# Patient Record
Sex: FEMALE | URBAN - METROPOLITAN AREA
[De-identification: names, ages, dates, MRNs, and addresses within clinical notes are randomized per-mention and may not be internally consistent; named-entity substitution may affect disease eponyms.]

---

## 2023-04-13 ENCOUNTER — HOSPITAL ENCOUNTER (OUTPATIENT)
Facility: AMBULATORY SURGERY CENTER | Age: 43
End: 2023-04-13

## 2023-07-18 RX ORDER — VIT C/B6/B5/MAGNESIUM/HERB 173 50-5-6-5MG
500 CAPSULE ORAL DAILY
COMMUNITY

## 2023-07-18 RX ORDER — KRILL/OM-3/DHA/EPA/PHOSPHO/AST 500-110 MG
500 CAPSULE ORAL DAILY
COMMUNITY

## 2023-07-18 RX ORDER — CYCLOBENZAPRINE HCL 5 MG
5 TABLET ORAL DAILY PRN
COMMUNITY
Start: 2022-10-12 | End: 2023-10-17

## 2023-07-18 RX ORDER — PRENATAL VIT/IRON FUM/FOLIC AC 27MG-0.8MG
1 TABLET ORAL DAILY
COMMUNITY

## 2023-07-18 RX ORDER — BROMOCRIPTINE MESYLATE 2.5 MG/1
TABLET ORAL
COMMUNITY
Start: 2023-07-12

## 2023-07-18 RX ORDER — CHORIOGONADOTROPIN ALFA 250 UG/.5ML
250 INJECTION, SOLUTION SUBCUTANEOUS
COMMUNITY
Start: 2022-10-07 | End: 2023-07-21

## 2023-07-18 RX ORDER — ONDANSETRON 4 MG/1
4 TABLET, ORALLY DISINTEGRATING ORAL DAILY PRN
COMMUNITY
Start: 2022-09-22

## 2023-07-18 RX ORDER — LETROZOLE 2.5 MG/1
2.5 TABLET, FILM COATED ORAL
COMMUNITY
End: 2023-07-21

## 2023-07-18 RX ORDER — PROGESTERONE 200 MG/1
200 CAPSULE ORAL DAILY
COMMUNITY
Start: 2022-04-26

## 2023-07-18 RX ORDER — TRIAMCINOLONE ACETONIDE 1 MG/G
1 CREAM TOPICAL
COMMUNITY
Start: 2023-03-31

## 2023-07-18 RX ORDER — PREDNISONE 20 MG/1
20 TABLET ORAL DAILY PRN
COMMUNITY
Start: 2023-03-31

## 2023-07-18 RX ORDER — CLOMIPHENE CITRATE 50 MG/1
100 TABLET ORAL DAILY
COMMUNITY
Start: 2021-11-30 | End: 2023-07-21

## 2023-07-20 ENCOUNTER — ANESTHESIA EVENT (OUTPATIENT)
Dept: SURGERY | Facility: AMBULATORY SURGERY CENTER | Age: 43
End: 2023-07-20
Payer: COMMERCIAL

## 2023-07-21 ENCOUNTER — ANESTHESIA (OUTPATIENT)
Dept: SURGERY | Facility: AMBULATORY SURGERY CENTER | Age: 43
End: 2023-07-21
Payer: COMMERCIAL

## 2023-07-21 ENCOUNTER — HOSPITAL ENCOUNTER (OUTPATIENT)
Facility: AMBULATORY SURGERY CENTER | Age: 43
Discharge: HOME | End: 2023-07-21
Payer: COMMERCIAL

## 2023-07-21 VITALS
TEMPERATURE: 96.6 F | HEART RATE: 76 BPM | WEIGHT: 126 LBS | HEIGHT: 68 IN | OXYGEN SATURATION: 100 % | BODY MASS INDEX: 19.1 KG/M2 | SYSTOLIC BLOOD PRESSURE: 103 MMHG | DIASTOLIC BLOOD PRESSURE: 57 MMHG | RESPIRATION RATE: 16 BRPM

## 2023-07-21 DIAGNOSIS — N80.00 ENDOMETRIOSIS, UTERUS: ICD-10-CM

## 2023-07-21 DIAGNOSIS — N80.519 ENDOMETRIOSIS, RECTUM: ICD-10-CM

## 2023-07-21 DIAGNOSIS — N72 ENDOCERVICITIS: ICD-10-CM

## 2023-07-21 DIAGNOSIS — N94.6 DYSMENORRHEA: ICD-10-CM

## 2023-07-21 DIAGNOSIS — N80.A62: ICD-10-CM

## 2023-07-21 DIAGNOSIS — N83.209 CYST OF OVARY, UNSPECIFIED LATERALITY: ICD-10-CM

## 2023-07-21 DIAGNOSIS — G89.18 POST-OP PAIN: Primary | ICD-10-CM

## 2023-07-21 DIAGNOSIS — K66.0 INTESTINAL ADHESIONS: ICD-10-CM

## 2023-07-21 DIAGNOSIS — N80.A0 ENDOMETRIOSIS, BLADDER: ICD-10-CM

## 2023-07-21 DIAGNOSIS — Z79.2 PROPHYLACTIC ANTIBIOTIC: ICD-10-CM

## 2023-07-21 DIAGNOSIS — N71.1 CHRONIC ENDOMETRITIS: ICD-10-CM

## 2023-07-21 DIAGNOSIS — N93.9 ABNORMAL UTERINE BLEEDING (AUB): ICD-10-CM

## 2023-07-21 DIAGNOSIS — N80.109 ENDOMETRIOSIS, OVARY: ICD-10-CM

## 2023-07-21 LAB
HCG UR QL: NEGATIVE
INTERNAL QC OK POCT: NORMAL
POCT KIT EXPIRATION DATE: NORMAL
POCT KIT LOT NUMBER: NORMAL

## 2023-07-21 PROCEDURE — 87798 DETECT AGENT NOS DNA AMP: CPT | Mod: 90 | Performed by: OBSTETRICS & GYNECOLOGY

## 2023-07-21 PROCEDURE — 87176 TISSUE HOMOGENIZATION CULTR: CPT | Performed by: OBSTETRICS & GYNECOLOGY

## 2023-07-21 PROCEDURE — 87102 FUNGUS ISOLATION CULTURE: CPT | Performed by: OBSTETRICS & GYNECOLOGY

## 2023-07-21 PROCEDURE — 99000 SPECIMEN HANDLING OFFICE-LAB: CPT | Performed by: OBSTETRICS & GYNECOLOGY

## 2023-07-21 PROCEDURE — 87563 M. GENITALIUM AMP PROBE: CPT | Mod: 90 | Performed by: OBSTETRICS & GYNECOLOGY

## 2023-07-21 PROCEDURE — 87591 N.GONORRHOEAE DNA AMP PROB: CPT | Performed by: OBSTETRICS & GYNECOLOGY

## 2023-07-21 PROCEDURE — 87491 CHLMYD TRACH DNA AMP PROBE: CPT | Performed by: OBSTETRICS & GYNECOLOGY

## 2023-07-21 PROCEDURE — 87075 CULTR BACTERIA EXCEPT BLOOD: CPT | Performed by: OBSTETRICS & GYNECOLOGY

## 2023-07-21 PROCEDURE — 87070 CULTURE OTHR SPECIMN AEROBIC: CPT | Performed by: OBSTETRICS & GYNECOLOGY

## 2023-07-21 RX ORDER — OXYCODONE HYDROCHLORIDE 10 MG/1
10 TABLET ORAL
Status: DISCONTINUED | OUTPATIENT
Start: 2023-07-21 | End: 2023-07-22 | Stop reason: HOSPADM

## 2023-07-21 RX ORDER — BUPIVACAINE HYDROCHLORIDE 2.5 MG/ML
INJECTION, SOLUTION INFILTRATION; PERINEURAL PRN
Status: DISCONTINUED | OUTPATIENT
Start: 2023-07-21 | End: 2023-07-21 | Stop reason: HOSPADM

## 2023-07-21 RX ORDER — LIDOCAINE 40 MG/G
CREAM TOPICAL
Status: DISCONTINUED | OUTPATIENT
Start: 2023-07-21 | End: 2023-07-22 | Stop reason: HOSPADM

## 2023-07-21 RX ORDER — DOXYCYCLINE 100 MG/1
100 CAPSULE ORAL ONCE
Status: COMPLETED | OUTPATIENT
Start: 2023-07-21 | End: 2023-07-21

## 2023-07-21 RX ORDER — GLYCOPYRROLATE 0.2 MG/ML
INJECTION, SOLUTION INTRAMUSCULAR; INTRAVENOUS PRN
Status: DISCONTINUED | OUTPATIENT
Start: 2023-07-21 | End: 2023-07-21

## 2023-07-21 RX ORDER — DEXAMETHASONE SODIUM PHOSPHATE 10 MG/ML
INJECTION, SOLUTION INTRAMUSCULAR; INTRAVENOUS PRN
Status: DISCONTINUED | OUTPATIENT
Start: 2023-07-21 | End: 2023-07-21

## 2023-07-21 RX ORDER — LIDOCAINE HYDROCHLORIDE 20 MG/ML
INJECTION, SOLUTION INFILTRATION; PERINEURAL PRN
Status: DISCONTINUED | OUTPATIENT
Start: 2023-07-21 | End: 2023-07-21

## 2023-07-21 RX ORDER — ONDANSETRON 4 MG/1
4 TABLET, ORALLY DISINTEGRATING ORAL EVERY 30 MIN PRN
Status: DISCONTINUED | OUTPATIENT
Start: 2023-07-21 | End: 2023-07-22 | Stop reason: HOSPADM

## 2023-07-21 RX ORDER — HYDROMORPHONE HCL IN WATER/PF 6 MG/30 ML
0.4 PATIENT CONTROLLED ANALGESIA SYRINGE INTRAVENOUS EVERY 5 MIN PRN
Status: DISCONTINUED | OUTPATIENT
Start: 2023-07-21 | End: 2023-07-22 | Stop reason: HOSPADM

## 2023-07-21 RX ORDER — KETAMINE HYDROCHLORIDE 10 MG/ML
INJECTION INTRAMUSCULAR; INTRAVENOUS PRN
Status: DISCONTINUED | OUTPATIENT
Start: 2023-07-21 | End: 2023-07-21

## 2023-07-21 RX ORDER — DOXYCYCLINE 100 MG/1
100 CAPSULE ORAL 2 TIMES DAILY
Qty: 9 CAPSULE | Refills: 0 | Status: ON HOLD | OUTPATIENT
Start: 2023-07-21 | End: 2023-08-10

## 2023-07-21 RX ORDER — ONDANSETRON 2 MG/ML
INJECTION INTRAMUSCULAR; INTRAVENOUS PRN
Status: DISCONTINUED | OUTPATIENT
Start: 2023-07-21 | End: 2023-07-21

## 2023-07-21 RX ORDER — OXYCODONE HYDROCHLORIDE 5 MG/1
5 TABLET ORAL
Status: COMPLETED | OUTPATIENT
Start: 2023-07-21 | End: 2023-07-21

## 2023-07-21 RX ORDER — MEPERIDINE HYDROCHLORIDE 25 MG/ML
12.5 INJECTION INTRAMUSCULAR; INTRAVENOUS; SUBCUTANEOUS EVERY 5 MIN PRN
Status: DISCONTINUED | OUTPATIENT
Start: 2023-07-21 | End: 2023-07-22 | Stop reason: HOSPADM

## 2023-07-21 RX ORDER — ONDANSETRON 2 MG/ML
4 INJECTION INTRAMUSCULAR; INTRAVENOUS EVERY 30 MIN PRN
Status: DISCONTINUED | OUTPATIENT
Start: 2023-07-21 | End: 2023-07-22 | Stop reason: HOSPADM

## 2023-07-21 RX ORDER — SCOLOPAMINE TRANSDERMAL SYSTEM 1 MG/1
1 PATCH, EXTENDED RELEASE TRANSDERMAL ONCE
Status: DISCONTINUED | OUTPATIENT
Start: 2023-07-21 | End: 2023-07-22 | Stop reason: HOSPADM

## 2023-07-21 RX ORDER — SODIUM CHLORIDE, SODIUM LACTATE, POTASSIUM CHLORIDE, CALCIUM CHLORIDE 600; 310; 30; 20 MG/100ML; MG/100ML; MG/100ML; MG/100ML
INJECTION, SOLUTION INTRAVENOUS CONTINUOUS
Status: DISCONTINUED | OUTPATIENT
Start: 2023-07-21 | End: 2023-07-22 | Stop reason: HOSPADM

## 2023-07-21 RX ORDER — HYDROMORPHONE HCL IN WATER/PF 6 MG/30 ML
0.2 PATIENT CONTROLLED ANALGESIA SYRINGE INTRAVENOUS EVERY 5 MIN PRN
Status: DISCONTINUED | OUTPATIENT
Start: 2023-07-21 | End: 2023-07-22 | Stop reason: HOSPADM

## 2023-07-21 RX ORDER — IBUPROFEN 800 MG/1
800 TABLET, FILM COATED ORAL EVERY 8 HOURS PRN
Qty: 30 TABLET | Refills: 1 | Status: ON HOLD | OUTPATIENT
Start: 2023-07-21 | End: 2023-08-10

## 2023-07-21 RX ORDER — PROPOFOL 10 MG/ML
INJECTION, EMULSION INTRAVENOUS CONTINUOUS PRN
Status: DISCONTINUED | OUTPATIENT
Start: 2023-07-21 | End: 2023-07-21

## 2023-07-21 RX ORDER — FENTANYL CITRATE 0.05 MG/ML
50 INJECTION, SOLUTION INTRAMUSCULAR; INTRAVENOUS EVERY 5 MIN PRN
Status: DISCONTINUED | OUTPATIENT
Start: 2023-07-21 | End: 2023-07-22 | Stop reason: HOSPADM

## 2023-07-21 RX ORDER — PROPOFOL 10 MG/ML
INJECTION, EMULSION INTRAVENOUS PRN
Status: DISCONTINUED | OUTPATIENT
Start: 2023-07-21 | End: 2023-07-21

## 2023-07-21 RX ORDER — MAGNESIUM SULFATE HEPTAHYDRATE 40 MG/ML
4 INJECTION, SOLUTION INTRAVENOUS ONCE
Status: COMPLETED | OUTPATIENT
Start: 2023-07-21 | End: 2023-07-21

## 2023-07-21 RX ORDER — ACETAMINOPHEN 325 MG/1
975 TABLET ORAL ONCE
Status: COMPLETED | OUTPATIENT
Start: 2023-07-21 | End: 2023-07-21

## 2023-07-21 RX ORDER — FENTANYL CITRATE 0.05 MG/ML
25 INJECTION, SOLUTION INTRAMUSCULAR; INTRAVENOUS EVERY 5 MIN PRN
Status: DISCONTINUED | OUTPATIENT
Start: 2023-07-21 | End: 2023-07-22 | Stop reason: HOSPADM

## 2023-07-21 RX ORDER — IBUPROFEN 800 MG/1
800 TABLET, FILM COATED ORAL ONCE
Status: CANCELLED | OUTPATIENT
Start: 2023-07-21 | End: 2023-07-21

## 2023-07-21 RX ORDER — ACETAMINOPHEN 325 MG/1
975 TABLET ORAL ONCE
Status: CANCELLED | OUTPATIENT
Start: 2023-07-21 | End: 2023-07-21

## 2023-07-21 RX ORDER — NEOSTIGMINE METHYLSULFATE 1 MG/ML
VIAL (ML) INJECTION PRN
Status: DISCONTINUED | OUTPATIENT
Start: 2023-07-21 | End: 2023-07-21

## 2023-07-21 RX ORDER — FENTANYL CITRATE 50 UG/ML
INJECTION, SOLUTION INTRAMUSCULAR; INTRAVENOUS PRN
Status: DISCONTINUED | OUTPATIENT
Start: 2023-07-21 | End: 2023-07-21

## 2023-07-21 RX ORDER — KETOROLAC TROMETHAMINE 30 MG/ML
INJECTION, SOLUTION INTRAMUSCULAR; INTRAVENOUS PRN
Status: DISCONTINUED | OUTPATIENT
Start: 2023-07-21 | End: 2023-07-21

## 2023-07-21 RX ORDER — FENTANYL CITRATE 0.05 MG/ML
25 INJECTION, SOLUTION INTRAMUSCULAR; INTRAVENOUS
Status: DISCONTINUED | OUTPATIENT
Start: 2023-07-21 | End: 2023-07-22 | Stop reason: HOSPADM

## 2023-07-21 RX ADMIN — PROPOFOL 20 MG: 10 INJECTION, EMULSION INTRAVENOUS at 11:23

## 2023-07-21 RX ADMIN — Medication 20 MG: at 12:02

## 2023-07-21 RX ADMIN — SODIUM CHLORIDE, SODIUM LACTATE, POTASSIUM CHLORIDE, CALCIUM CHLORIDE: 600; 310; 30; 20 INJECTION, SOLUTION INTRAVENOUS at 13:14

## 2023-07-21 RX ADMIN — FENTANYL CITRATE 50 MCG: 0.05 INJECTION, SOLUTION INTRAMUSCULAR; INTRAVENOUS at 13:06

## 2023-07-21 RX ADMIN — DOXYCYCLINE 100 MG: 100 CAPSULE ORAL at 14:11

## 2023-07-21 RX ADMIN — ACETAMINOPHEN 975 MG: 325 TABLET ORAL at 10:27

## 2023-07-21 RX ADMIN — FENTANYL CITRATE 25 MCG: 50 INJECTION, SOLUTION INTRAMUSCULAR; INTRAVENOUS at 11:23

## 2023-07-21 RX ADMIN — KETOROLAC TROMETHAMINE 15 MG: 30 INJECTION, SOLUTION INTRAMUSCULAR; INTRAVENOUS at 12:38

## 2023-07-21 RX ADMIN — KETAMINE HYDROCHLORIDE 20 MG: 10 INJECTION INTRAMUSCULAR; INTRAVENOUS at 11:17

## 2023-07-21 RX ADMIN — PROPOFOL 130 MG: 10 INJECTION, EMULSION INTRAVENOUS at 11:16

## 2023-07-21 RX ADMIN — FENTANYL CITRATE 50 MCG: 0.05 INJECTION, SOLUTION INTRAMUSCULAR; INTRAVENOUS at 13:13

## 2023-07-21 RX ADMIN — Medication 3 MG: at 12:41

## 2023-07-21 RX ADMIN — LIDOCAINE HYDROCHLORIDE 60 MG: 20 INJECTION, SOLUTION INFILTRATION; PERINEURAL at 11:16

## 2023-07-21 RX ADMIN — FENTANYL CITRATE 25 MCG: 50 INJECTION, SOLUTION INTRAMUSCULAR; INTRAVENOUS at 11:28

## 2023-07-21 RX ADMIN — GLYCOPYRROLATE 0.4 MG: 0.2 INJECTION, SOLUTION INTRAMUSCULAR; INTRAVENOUS at 12:41

## 2023-07-21 RX ADMIN — FENTANYL CITRATE 50 MCG: 50 INJECTION, SOLUTION INTRAMUSCULAR; INTRAVENOUS at 11:16

## 2023-07-21 RX ADMIN — OXYCODONE HYDROCHLORIDE 5 MG: 5 TABLET ORAL at 14:11

## 2023-07-21 RX ADMIN — PROPOFOL 200 MCG/KG/MIN: 10 INJECTION, EMULSION INTRAVENOUS at 11:20

## 2023-07-21 RX ADMIN — MAGNESIUM SULFATE HEPTAHYDRATE 4 G: 40 INJECTION, SOLUTION INTRAVENOUS at 10:52

## 2023-07-21 RX ADMIN — Medication 30 MG: at 11:17

## 2023-07-21 RX ADMIN — ONDANSETRON 4 MG: 2 INJECTION INTRAMUSCULAR; INTRAVENOUS at 11:22

## 2023-07-21 RX ADMIN — SODIUM CHLORIDE, SODIUM LACTATE, POTASSIUM CHLORIDE, CALCIUM CHLORIDE: 600; 310; 30; 20 INJECTION, SOLUTION INTRAVENOUS at 10:51

## 2023-07-21 RX ADMIN — DEXAMETHASONE SODIUM PHOSPHATE 10 MG: 10 INJECTION, SOLUTION INTRAMUSCULAR; INTRAVENOUS at 11:17

## 2023-07-21 NOTE — DISCHARGE INSTRUCTIONS
Discharge Instructions from Dr. Alcantara    Pain:  Take (at the same time or alternating if you prefer) 800 mg of ibuprofen every 8 hours and 1000 mg of Tylenol every 8 hours.   Use a heating pad for shoulder pain if you experience this.  This is from the gas that was used to fill your abdomen during the time of surgery.   Use ice packs on your incisions if they are painful.  Your abdominal muscles may be sore (feeling like to exercised them) for several days follow surgery. This is due to the gas that was used to fill your abdomen at the time of surgery and is a normal sensation following surgery.   You may have mild swelling in the face/upper half of the body for the 24 hours following surgery. This is from being in trendelenberg position (upper part of the body tilted slightly down during surgery for best view of the pelvis). The swelling should resolve within 24 hours following surgery.  You may have a sore throat from the breathing tube used during surgery. This should resolve within a few days following surgery.     Incisions:  The day after the surgery, you can shower and remove the bandages over the incisions and clean the incisions gently with soap and water. Bandages do not need to be placed over the incisions after this; the incisions should be open to the air.  The incision in or near the bellybutton as well as other incisions should be cleaned 1-2 times per day and dried very well.    It is recommended that the incision in or near the bellybutton is dried with a blow dryer as it usually can not be dried completely with a towel.   Keeping the incisions clean and dry will decrease the chance of infection.   It is not required, but some patients have found that an abdominal binder can make the incisions more comfortable with movement.    Activity:  Be sure to walk the length of a football field at least 3 times per day everyday following surgery and at least once the night of surgery. This will help to  prevent complications and help you to recover faster.   Restrict lifting to less than 20 pounds for 2 weeks and less than 40 pounds for 4 weeks.  Do not submerge in water (bath in bathtub, swimming in pool, swimming in lake, etc.) for 2 weeks.  Showering is fine and encouraged.   Do not drive while you have significant pain. Before driving, sit in the car with it turned off and press the brakes quickly; if you are able to do this without significant pain, it is ok to drive.   Do not drive if you are taking narcotic pain medications (oxycodone, hydrocodone); wait until 24 hours after the last dose.  Activities other than those listed as not to do above are okay to do following surgery.    Nutrition:  It is very important to consume protein in order to recover well from surgery.  Your body needs protein to heal after surgery.  The recommended amount is 0.7g to 0.9g per pound of body weight per day for 3 months after surgery. Multiply your body weight in pounds by 0.9 grams, and this is the goal amount of protein intake per day.  Multiply your body weight in pounds by 0.7 grams, and this is the minimal amount of protein intake per day recommended for recovering from surgery. It is okay if you do not get this much some days, this is an estimated goal. Having protein in a drink can be helpful such as bone broth (be sure to check that there is a good amount of protein in it) or protein supplement shakes. Eating smaller amounts more frequently may also be helpful.  Please also be sure to drink at least 2 liters of liquids every day.    Stool Softener:  If you do not have a regular bowel movement by the day after surgery, please take a stool softener such as colace (docusate sodium) 100mg one to two times per day until you have at least one regular soft bowel movement per day.     Call [phone] for questions during the day and for emergencies on nights and weekends.  Please call if these things occur:  Fever of 100.4  Fahrenheit degrees or higher.  Pus draining from the incisions or red incisions (clear fluid or blood from the incisions is okay as long as it is a minimal amount).  Significant swelling in one leg.  Difficulty with urination or unable to urinate for a period of more than 6 hours.  Nausea or vomiting that makes you unable to eat for more than a day.   Heavy bleeding greater than the amount of a normal period (spotting with or without very small pieces of tissue for a few days following the procedure is normal due to the biopsies that were done; you may also notice a small amount of gel that is used in placement of the catheter that drains your bladder during surgery).    Antibiotics:  Because your fallopian tubes were partially blocked and cleared, it is recommended that you take doxycycline 2 times per day for a total of 10 doses (9 pills in prescription and one dose you got during or right after surgery).  It is important that you take this with food and sit upright for 30 minutes following taking this medication.  If you lie down right after taking this medication, it can irritate your esophagus.    Marisol Alcantara MD      You have received 975 mg of Acetaminophen (Tylenol) at 10:30 AM. Please do not take an additional dose of Tylenol until after 4:30 PM.    Do not exceed 4,000 mg of acetaminophen during a 24 hour period and keep in mind that acetaminophen can also be found in many over-the-counter cold medications as well as narcotics that may be given for pain.     You received a dose of IV Toradol at 12:30 PM. Please do not take any additional Ibuprofen products (Aleve/Advil/Motrin/Naproxen/Celebrex) until after 6:30 PM.    If you have any questions or concerns regarding your procedure, please contact Dr. Alcantara, her office number is [phone].

## 2023-07-21 NOTE — OP NOTE
Operative Note   07/21/23    Patient: Ashia Bass 1980     Procedure: Procedure(s):  ENDOCERVICAL AND ENDOMETRIAL CULTURES AND BIOPSIES,  BILATERAL FALLOPIAN TUBE RECANALIZATION, BILATERAL SELECTIVE HYSTEROSALPINGOGRAM, HYSTEROSCOPY, DIAGNOSTIC LAPAROSCOPY, ENTEROLYSIS     Surgeon: Marisol Alcantara MD    Pre-operative Diagnosis:   Dysmenorrhea [N94.6]  Abnormal uterine bleeding (AUB) [N93.9]     Post-operative Diagnosis:   Same plus:     Endocervicitis     Chronic endometritis     Ovarian cyst     Intestinal adhesions     Endometriosis, uterus     Endometriosis, ovary     Endometriosis, bladder     Endometriosis of left ureter     Endometriosis, rectum     Anestheisa: general    Antibiotics: PO doxycycline post-op    EBL: 5 mL from 7/21/2023 11:12 AM to 7/21/2023  1:00 PM     UOP: 100cc    IV Fluids: 1000cc    Findings:  Speculum Exam:  Grade 1 anterior cervical ectropion    Selective-Hysterosalpingogram  Uterus: normal fill  Right fallopian tube: pressure 2.0 LAVELL with sluggish spill; after fallopian tube recanalization, pressure 0.5 LAVELL with free spill   Left fallopian tube: pressure 4.0 LAVELL with minimal sluggish spill; after fallopian tube recanalization, pressure 0.5 LAVELL with free spill     Hysteroscopy  Endometrium: diffuse cobblestoning; multiple micropolyps  Cavity: Normal shape  Right tubal ostia: patent  Left tubal ostia: patent  Cervix: cervical crypts present with multiple micropolyps    Laparoscopy  Uterus: White scar on posterior fundus consistent with superficial endometriosis  Right fallopian tube: Normal course, normal fimbriae  Right ovary: Normal size, surface with rugae, half centimeter fibroma on the inferior pole, surface with many clear vesicles consistent with superficial endometriosis  Right pelvic side wall: White scar consistent with superficial endometriosis  Right ureter: Appeared normal  Right broad ligament: Appeared normal  Right uterosacral ligament: Dense right  posterior cul-de-sac lesion likely involves the uterosacral ligament  Posterior cul-de-sac: Dense and deep infiltrating white scar and powder burn lesion in the right superior posterior cul-de-sac consistent with deep infiltrating endometriosis, clear vesicle and powder burn lesion on the left side consistent with superficial endometriosis, all of these lesions directly abut the rectum  Left uterosacral ligament: Appeared normal  Left pelvic sidewall: Multiple white scar and halo lesions consistent with superficial endometriosis; adhesions to the left ovary  Left ureter: Multiple white scarred and halo lesions on the left pelvic sidewall and left pelvic brim directly overlies the left ureter  Left broad ligament: Appeared normal  Left ovary: Normal size, surface with rugae, many clear vesicles consistent with superficial endometriosis, cyst on superior abdominal wall consistent with either 2 cm endometrioma or hemorrhagic cyst, deep infiltrating endometriosis on the underside with adhesions to the left pelvic sidewall  Left fallopian tube: Normal course, normal fimbriae  Left round ligament: Appeared normal  Bladder peritoneum: 2 significant fibrotic patches of endometriosis, 1 on the left, 1 on the right, both appear to be dense, however could be removed without damaging the bladder  Right round ligament: Appeared normal  Abdominal wall: Appeared normal  Appendix: Appeared normal  Cecum: Appeared normal  Terminal ileum: Appeared normal  Omentum: Appeared normal  Gall bladder: Appeared normal  Liver: White scar on the inferior right lobe, otherwise right and left lobes appeared normal  Diaphragm: right and left sides appeared normal  Sigmoid colon: Adhesions to left pelvic sidewall, otherwise appeared normal with normal epiploica   Left pericolic gutter: Halo lesions consistent with superficial endometriosis  Right pericolic gutter: White scar lesions consistent with superficial endometriosis  Rectum: The posterior  cul-de-sac lesions directly abut the rectum    Description of Procedure:   After informed consent was obtained, the patient was brought to the operating room.  She was transferred to the operating room table and underwent general anesthesia.  She was then placed in lithotomy with the yellowfin stirrups with arms tucked assuring adequate padding to prevent neurovascular compromise.  The patient was prepped and draped on the abdomen and perineum. A speculum was placed into the vagina and endocervical cultures were obtained.  An internal vaginal prep was then performed, and the vega catheter was placed. Endometrial cultures were obtained. The selective hysterosalpingogram was then performed with the Enrrique Fuentes catheter under fluoroscopic guidance with the above findings. Bilateral fallopian tube recannulization was performed under fluoroscopic guidance with the flexible wire from the Enrrique Fuentes catheter set with 3 passes down each fallopian tube.  Repeat selective fluoroscopic evaluation of the fallopian tubew revealed the above findings. The cervix was dilated, and hysteroscopy was performed with a hysteroscope which was placed through the cervix into the uterine cavity, and the entirety of the endometrial cavity and endocervix were assessed with the above findings.  The hysteroscope was removed. Endocervical and endometrial biopsies were obtained. The uterus sounded to 8 cm, and thus the 8 cm Paloma tip was applied to the Paloma uterine manipulator and this was placed in the uterus.  The surgeon's gown and gloves were changed and attention was turned to the laparoscopic portion of the case. An incision was made in the umbilicus, and a Kocher clamp was used to grasp the fascia. The fascia was grasped inferior to this and then lateral to this point on both sides with an additional Kocher. With the fascia significantly elevated, an incision was made in the fascia, and 10mm blunt tipped trocar was placed.   Intra-abdominal placement was confirmed with the laparoscope, and the abdominal cavity was insufflated to a pressure of 15 for port placement.      A 5 mm suprapubic port was placed.  The insufflation pressure was reduced to 10. Using a blunt tipped suction  for manipulation, a thorough, close look diagnostic laparoscopy was performed with the above findings.  Enterolysis was required to fully evaluate the left pelvic sidewall and left adnexa.  The adhesions from the sigmoid to the left pelvic sidewall were taken down with a cold scissors.  Hemostasis was observed.  Due to the deep infiltrating nature of the endometriosis and the involvement of the rectum and the left ureter, robotic surgery with the CO2 laser will be performed at a later time for thorough treatment and excision of the pathology.    The intra-abdominal gas was allowed to escape through the trocars including multiple Valsalvas to assist with gas removal.  The trocars were removed. The fascia of the umbilical incision was closed with a figure-of-eight stitch with 0 Vicryl.  The skin incisions were closed with a subcuticular stitch with 4-0 Vicryl. Intradermal local anesthetic was placed in each incision. The incisions were covered with Telfa, a folded 2 x 2 gauze, and a Tegaderm for pressure dressings. The uterine manipulator was removed. The vega remained in place for voiding trial in post-op. Instrument and sponge counts were correct. The patient was cleaned and taken to the recovery room in stable condition. She tolerated the procedure well.    Specimens:   ID Type Source Tests Collected by Time Destination   1 : Please evaluate for acute and/or chronic inflammation Biopsy [facility] SURGICAL PATHOLOGY EXAM Marisol Alcantara MD 7/21/2023 11:58 AM    2 : Please evaluate for acute and/or chronic inflammation including  IHC Biopsy Endometrium SURGICAL PATHOLOGY EXAM Marisol Alcantara MD 7/21/2023 11:59 AM    A : Test for [id number]  Chlamydia trachomatis/Neisseria gonorrhoeae by PCR Other [facility] LABORATORY MISCELLANEOUS ORDER Marisol Alcantara MD 7/21/2023 11:44 AM    B : Test for ARUP [id number] urogential ureaplasma & mycoplasma PCR Other [facility] LABORATORY MISCELLANEOUS ORDER Marisol Alcantara MD 7/21/2023 11:44 AM    C :  Tissue Endometrium ANAEROBIC BACTERIAL CULTURE ROUTINE, FUNGAL OR YEAST CULTURE ROUTINE, AEROBIC BACTERIAL CULTURE ROUTINE Marisol Alcantara MD 7/21/2023 11:44 AM      Drains: vega catheter (left in place for voiding trial in post-op), uterine manipulator (removed at conclusion of procedure)    Implants: none    Complications: none    Marisol Alcantara MD

## 2023-07-21 NOTE — ANESTHESIA CARE TRANSFER NOTE
Patient: Ashia Bass    Procedure: Procedure(s):  ENDOCERVICAL AND ENDOMETRIAL CULTURES AND BIOPSIES,  BILATERAL FALLOPIAN TUBE RECANALIZATION, BILATERAL SELECTIVE HYSTEROSALPINGOGRAM, HYSTEROSCOPY, DIAGNOSTIC LAPAROSCOPY, ENTEROLYSIS       Diagnosis: Dysmenorrhea [N94.6]  Abnormal uterine bleeding (AUB) [N93.9]  Diagnosis Additional Information: No value filed.    Anesthesia Type:   General     Note:    Oropharynx: oropharynx clear of all foreign objects  Level of Consciousness: drowsy  Oxygen Supplementation: face mask  Level of Supplemental Oxygen (L/min / FiO2): 8  Independent Airway: airway patency satisfactory and stable  Dentition: dentition unchanged  Vital Signs Stable: post-procedure vital signs reviewed and stable  Report to RN Given: handoff report given  Patient transferred to: PACU    Handoff Report: Identifed the Patient, Identified the Reponsible Provider, Reviewed the pertinent medical history, Discussed the surgical course, Reviewed Intra-OP anesthesia mangement and issues during anesthesia, Set expectations for post-procedure period and Allowed opportunity for questions and acknowledgement of understanding      Vitals:  Vitals Value Taken Time   /65 07/21/23 1256   Temp 96.6  F (35.9  C) 07/21/23 1256   Pulse 71 07/21/23 1256   Resp 16 07/21/23 1256   SpO2 100 % 07/21/23 1256       Electronically Signed By: DELICIA Franco CRNA  July 21, 2023  12:59 PM

## 2023-07-21 NOTE — ANESTHESIA PREPROCEDURE EVALUATION
Anesthesia Pre-Procedure Evaluation    Patient: Ashia Bass   MRN: [id number] : 1980        Procedure : Procedure(s):  ENDOCERVICAL AND ENDOMETRIAL CULTURES AND BIOPSIES, HYSTEROSCOPY, DIAGNOSTIC LAPAROSCOPY, POSSIBLE LAPAROSCOPIC LASER SURGERY OF ENDOMETRIOSIS, POSSIBLE LAPAROSCOPIC APPENDECTOMY, BILATERAL SELECTIVE HYSTEROSALPINGOGRAM, POSSIBLE BILATERAL FALLOPIAN TUBE RECANALIZATION  HYSTEROSCOPY,  APPENDECTOMY, LAPAROSCOPIC          History reviewed. No pertinent past medical history.   Past Surgical History:   Procedure Laterality Date     COLONOSCOPY       CYST REMOVAL        No Known Allergies   Social History     Tobacco Use     Smoking status: Never     Smokeless tobacco: Never   Substance Use Topics     Alcohol use: Never      Wt Readings from Last 1 Encounters:   23 57.2 kg (126 lb)        Anesthesia Evaluation   Pt has had prior anesthetic.     No history of anesthetic complications       ROS/MED HX  ENT/Pulmonary:  - neg pulmonary ROS     Neurologic: Comment: Pituitary adenoma on bromocriptine       Cardiovascular:  - neg cardiovascular ROS     METS/Exercise Tolerance:     Hematologic:  - neg hematologic  ROS     Musculoskeletal:  - neg musculoskeletal ROS     GI/Hepatic:  - neg GI/hepatic ROS     Renal/Genitourinary:  - neg Renal ROS     Endo: Comment: Relapsing polychondritis       Psychiatric/Substance Use:  - neg psychiatric ROS     Infectious Disease:  - neg infectious disease ROS     Malignancy:  - neg malignancy ROS     Other:  - neg other ROS          Physical Exam    Airway  airway exam normal      Mallampati: I   TM distance: > 3 FB   Neck ROM: full   Mouth opening: > 3 cm    Respiratory Devices and Support         Dental       (+) Minor Abnormalities - some fillings, tiny chips      Cardiovascular   cardiovascular exam normal       Rhythm and rate: regular and normal     Pulmonary   pulmonary exam normal        breath sounds clear to auscultation           OUTSIDE  LABS:  CBC: No results found for: WBC, HGB, HCT, PLT  BMP: No results found for: NA, POTASSIUM, CHLORIDE, CO2, BUN, CR, GLC  COAGS: No results found for: PTT, INR, FIBR  POC:   Lab Results   Component Value Date    HCG Negative 07/21/2023     HEPATIC: No results found for: ALBUMIN, PROTTOTAL, ALT, AST, GGT, ALKPHOS, BILITOTAL, BILIDIRECT, RON  OTHER: No results found for: PH, LACT, A1C, LILIA, PHOS, MAG, LIPASE, AMYLASE, TSH, T4, T3, CRP, SED    Anesthesia Plan    ASA Status:  2   NPO Status:  NPO Appropriate    Anesthesia Type: General.     - Airway: ETT   Induction: Intravenous, Propofol.   Maintenance: TIVA.        Consents    Anesthesia Plan(s) and associated risks, benefits, and realistic alternatives discussed. Questions answered and patient/representative(s) expressed understanding.    - Discussed:     - Discussed with:  Patient         Postoperative Care    Pain management: IV analgesics, Oral pain medications, Multi-modal analgesia.   PONV prophylaxis: Ondansetron (or other 5HT-3), Dexamethasone or Solumedrol, Droperidol or Haldol     Comments:                SHANTEL MENA MD

## 2023-07-21 NOTE — ANESTHESIA POSTPROCEDURE EVALUATION
Patient: Ashia Bass    Procedure: Procedure(s):  ENDOCERVICAL AND ENDOMETRIAL CULTURES AND BIOPSIES,  BILATERAL FALLOPIAN TUBE RECANALIZATION, BILATERAL SELECTIVE HYSTEROSALPINGOGRAM, HYSTEROSCOPY, DIAGNOSTIC LAPAROSCOPY, ENTEROLYSIS       Anesthesia Type:  General    Note:  Disposition: Outpatient   Postop Pain Control: Uneventful            Sign Out: Well controlled pain   PONV: No   Neuro/Psych: Uneventful            Sign Out: Acceptable/Baseline neuro status   Airway/Respiratory: Uneventful            Sign Out: Acceptable/Baseline resp. status   CV/Hemodynamics: Uneventful            Sign Out: Acceptable CV status; No obvious hypovolemia; No obvious fluid overload   Other NRE: NONE   DID A NON-ROUTINE EVENT OCCUR? No           Last vitals:  Vitals Value Taken Time   /59 07/21/23 1315   Temp 96.6  F (35.9  C) 07/21/23 1256   Pulse 63 07/21/23 1319   Resp 16 07/21/23 1315   SpO2 97 % 07/21/23 1319   Vitals shown include unvalidated device data.    Electronically Signed By: SHANTEL MENA MD  July 21, 2023  1:44 PM

## 2023-07-21 NOTE — INTERVAL H&P NOTE
Physcial Exam  See careeverywhere for recent extensive evaluation at [facility] including cardiopulmonary eval.

## 2023-07-21 NOTE — INTERVAL H&P NOTE
I have reviewed the H&P and there are no changes. I discussed the surgical plan with the patient in pre-op, and all of her questions were answered. She desires to proceed with surgery.    Marisol Alcantara MD

## 2023-07-21 NOTE — H&P
2023 Ashia Bass 1980  HPI: The patient presents for severe pain with her menses as well as abnormal bleeding.  Her and her  also desire pregnancy.  She has had 1 miscarriage.  She has menses every 21-28 days, 4-6 days of bleeding, 2-3 days of premenstrual spotting, up to 2 days of brown bleeding at the end of menses, and 1 or 2 days of intermenstrual bleeding.  She states her menses are very heavy and she bleeds through a pad in less than an hour.  She denies pain or bleeding with bowel movements or urination.  She has 10 out of 10 pain with her menses that would cause her to miss work at least one day per month.  She has pain in her pelvis as well as her back.  The pain causes her to have vomiting and loose stools.  She tried to take oral contraceptive pills for the pain, however she still had to miss work somewhat regularly.  She also has cramping pain with intercourse intermittently.  She knows when she is ovulating, however this pain does not affect her activities of daily living.  She has PMS symptoms in the form of irritability, bloating, carbohydrate cravings, salt cravings, headache, nausea for 3-4 days prior to her menses.  She has some hirsutism and acne. They have tried to cycles of Clomid and 3 cycles of letrozole with about a half of the cycles using trigger with Ovidrel without success.  The 1 pregnancy she achieved was spontaneous.  Of note, starting early in , the patient had a few weeks of irregular bleeding.  Further workup led to increased prolactin an increase FSH.  Once prolactin was controlled, FSH was also normal, and bleeding was more regular.  Per endocrinology, elevated FSH was thought to be due to irregular ovulatory attempts during elevated prolactin.  The patient also has autoimmune disease(relapsing polychondritis) for which she was recently evaluated at [place].    OB history: , miscarriage managed expectantly at 6-1/2 weeks 2021  GYN history: Denies  history of abnormal Pap smears or STDs  Medical history: hyperprolactinemia, TMJ, scoliosis, migraines, relapsing polychondritis  Surgical history: wisdom tooth extraction, treatment of Bartholin cyst   Family history: Denies history of bleeding or clotting disorders  Social history: Denies use of tobacco or drugs, denies use of alcohol  Medications: bromcriptine, PNV, krill oil, tumeric, Ca, Vit D, CoQ10  Allergies: NKDA    Objective:   Vitals: To be completed in preop  Physical Exam: To be completed in preop    Labs: elevated FSH and prolactin, however these have resolved    Ultrasound at [place] 23:  Endo 7 Trilaminar   There is a 1.9x1.6x1.6 cm hypoechoic solid mass with blood flow in the right lateral cervix area which likely represents a fibroid.   AFC 9   Right ovary 18x20 mm follicle   Left ovary 15x14 mm follicle, 20x20 mm follicle     Assessment/plan 42 year-old  with severe dysmenorrhea, abnormal uterine bleeding:   We extensively discussed the procedure of  endocervical and endometrial cultures and biopsies, bilateral selective hysterosalpingogram, hysteroscopy, diagnostic laparoscopy, possible bilateral fallopian tube recanalization, possible laparoscopic Carbon dioxide laser surgery of endometriosis, possible laparoscopic appendectomy.    We discussed the risks and benefits of surgery including but not limited to the risks of blood loss (the patient consents to blood transfusion if needed), infection (skin or pelvic), injury to other organs (uterus, fallopian tubes, ovaries, bladder, bowel, ureters, nerves, vessels), hernia, risks of anesthesia (DVT, pulmonary embolism, pneumonia, death).   We discussed the possibility of another surgery if the pathology cannot be adequately treated at the time of the diagnostic surgery. The patient expressed understanding of the above, all of her questions were answered, and she desires to proceed to surgery.    Marisol Alcantara MD

## 2023-07-21 NOTE — ANESTHESIA PROCEDURE NOTES
Airway       Patient location during procedure: OR       Procedure Start/Stop Times: 7/21/2023 11:20 AM  Staff -        CRNA: Veronique Webb APRN CRNA       Performed By: CRNA  Consent for Airway        Urgency: elective  Indications and Patient Condition       Indications for airway management: sarwat-procedural       Induction type:intravenous       Mask difficulty assessment: 1 - vent by mask    Final Airway Details       Final airway type: endotracheal airway       Successful airway: ETT - single  Endotracheal Airway Details        ETT size (mm): 7.0       Cuffed: yes       Successful intubation technique: direct laryngoscopy       DL Blade Type: Ospina 2       Grade View of Cords: 1       Adjucts: stylet       Position: Center       Measured from: gums/teeth       Secured at (cm): 22       Bite block used: None    Post intubation assessment        Placement verified by: capnometry, equal breath sounds and chest rise        Number of attempts at approach: 1       Number of other approaches attempted: 0       Secured with: silk tape       Ease of procedure: easy       Dentition: Intact       Dental guard used and removed. Dental Guard Type: Proguard Red.    Medication(s) Administered   Medication Administration Time: 7/21/2023 11:20 AM

## 2023-07-22 LAB
C TRACH DNA SPEC QL PROBE+SIG AMP: NEGATIVE
N GONORRHOEA DNA SPEC QL NAA+PROBE: NEGATIVE

## 2023-07-25 LAB
M GENITALIUM DNA SPEC QL NAA+PROBE: NOT DETECTED
M HOMINIS DNA SPEC QL NAA+PROBE: NOT DETECTED
U PARVUM DNA SPEC QL NAA+PROBE: NOT DETECTED
U UREALYTICUM DNA SPEC QL NAA+PROBE: NOT DETECTED

## 2023-07-26 LAB — BACTERIA TISS BX CULT: NO GROWTH

## 2023-07-28 LAB — BACTERIA TISS BX CULT: NORMAL

## 2023-08-09 ENCOUNTER — ANESTHESIA EVENT (OUTPATIENT)
Dept: SURGERY | Facility: HOSPITAL | Age: 43
End: 2023-08-09
Payer: COMMERCIAL

## 2023-08-10 ENCOUNTER — HOSPITAL ENCOUNTER (OUTPATIENT)
Facility: HOSPITAL | Age: 43
Discharge: HOME | End: 2023-08-10
Attending: OBSTETRICS & GYNECOLOGY | Admitting: OBSTETRICS & GYNECOLOGY
Payer: COMMERCIAL

## 2023-08-10 ENCOUNTER — ANESTHESIA (OUTPATIENT)
Dept: SURGERY | Facility: HOSPITAL | Age: 43
End: 2023-08-10
Payer: COMMERCIAL

## 2023-08-10 VITALS
TEMPERATURE: 97.8 F | SYSTOLIC BLOOD PRESSURE: 103 MMHG | DIASTOLIC BLOOD PRESSURE: 60 MMHG | OXYGEN SATURATION: 100 % | HEART RATE: 78 BPM | WEIGHT: 124.5 LBS | BODY MASS INDEX: 18.93 KG/M2 | RESPIRATION RATE: 16 BRPM

## 2023-08-10 DIAGNOSIS — G89.18 POST-OP PAIN: ICD-10-CM

## 2023-08-10 DIAGNOSIS — G89.18 ACUTE POST-OPERATIVE PAIN: Primary | ICD-10-CM

## 2023-08-10 LAB
HCG INTACT+B SERPL-ACNC: <1 MIU/ML
HOLD SPECIMEN: NORMAL

## 2023-08-10 PROCEDURE — C9290 INJ, BUPIVACAINE LIPOSOME: HCPCS | Performed by: ANESTHESIOLOGY

## 2023-08-10 PROCEDURE — 250N000011 HC RX IP 250 OP 636: Mod: JZ | Performed by: ANESTHESIOLOGY

## 2023-08-10 PROCEDURE — 250N000011 HC RX IP 250 OP 636: Performed by: ANESTHESIOLOGY

## 2023-08-10 PROCEDURE — 250N000011 HC RX IP 250 OP 636: Mod: JZ | Performed by: OBSTETRICS & GYNECOLOGY

## 2023-08-10 PROCEDURE — 250N000009 HC RX 250: Performed by: ANESTHESIOLOGY

## 2023-08-10 PROCEDURE — 84702 CHORIONIC GONADOTROPIN TEST: CPT | Performed by: OBSTETRICS & GYNECOLOGY

## 2023-08-10 PROCEDURE — 96372 THER/PROPH/DIAG INJ SC/IM: CPT | Performed by: OBSTETRICS & GYNECOLOGY

## 2023-08-10 PROCEDURE — 258N000003 HC RX IP 258 OP 636: Performed by: OBSTETRICS & GYNECOLOGY

## 2023-08-10 PROCEDURE — 250N000009 HC RX 250: Performed by: NURSE ANESTHETIST, CERTIFIED REGISTERED

## 2023-08-10 PROCEDURE — 360N000080 HC SURGERY LEVEL 7, PER MIN: Performed by: OBSTETRICS & GYNECOLOGY

## 2023-08-10 PROCEDURE — 250N000011 HC RX IP 250 OP 636: Performed by: OBSTETRICS & GYNECOLOGY

## 2023-08-10 PROCEDURE — 250N000013 HC RX MED GY IP 250 OP 250 PS 637: Performed by: OBSTETRICS & GYNECOLOGY

## 2023-08-10 PROCEDURE — 710N000010 HC RECOVERY PHASE 1, LEVEL 2, PER MIN: Performed by: OBSTETRICS & GYNECOLOGY

## 2023-08-10 PROCEDURE — 370N000017 HC ANESTHESIA TECHNICAL FEE, PER MIN: Performed by: OBSTETRICS & GYNECOLOGY

## 2023-08-10 PROCEDURE — 88342 IMHCHEM/IMCYTCHM 1ST ANTB: CPT | Mod: 26 | Performed by: PATHOLOGY

## 2023-08-10 PROCEDURE — C1765 ADHESION BARRIER: HCPCS | Performed by: OBSTETRICS & GYNECOLOGY

## 2023-08-10 PROCEDURE — 250N000025 HC SEVOFLURANE, PER MIN: Performed by: OBSTETRICS & GYNECOLOGY

## 2023-08-10 PROCEDURE — 258N000003 HC RX IP 258 OP 636: Performed by: ANESTHESIOLOGY

## 2023-08-10 PROCEDURE — 36415 COLL VENOUS BLD VENIPUNCTURE: CPT | Performed by: OBSTETRICS & GYNECOLOGY

## 2023-08-10 PROCEDURE — 88341 IMHCHEM/IMCYTCHM EA ADD ANTB: CPT | Mod: 26 | Performed by: PATHOLOGY

## 2023-08-10 PROCEDURE — 272N000001 HC OR GENERAL SUPPLY STERILE: Performed by: OBSTETRICS & GYNECOLOGY

## 2023-08-10 PROCEDURE — 258N000001 HC RX 258: Performed by: OBSTETRICS & GYNECOLOGY

## 2023-08-10 PROCEDURE — 999N000141 HC STATISTIC PRE-PROCEDURE NURSING ASSESSMENT: Performed by: OBSTETRICS & GYNECOLOGY

## 2023-08-10 PROCEDURE — 710N000012 HC RECOVERY PHASE 2, PER MINUTE: Performed by: OBSTETRICS & GYNECOLOGY

## 2023-08-10 PROCEDURE — 88305 TISSUE EXAM BY PATHOLOGIST: CPT | Mod: TC | Performed by: OBSTETRICS & GYNECOLOGY

## 2023-08-10 PROCEDURE — 88307 TISSUE EXAM BY PATHOLOGIST: CPT | Mod: 26 | Performed by: PATHOLOGY

## 2023-08-10 PROCEDURE — 250N000011 HC RX IP 250 OP 636: Performed by: NURSE ANESTHETIST, CERTIFIED REGISTERED

## 2023-08-10 PROCEDURE — 88305 TISSUE EXAM BY PATHOLOGIST: CPT | Mod: 26 | Performed by: PATHOLOGY

## 2023-08-10 PROCEDURE — 250N000009 HC RX 250: Performed by: OBSTETRICS & GYNECOLOGY

## 2023-08-10 PROCEDURE — 250N000013 HC RX MED GY IP 250 OP 250 PS 637: Performed by: ANESTHESIOLOGY

## 2023-08-10 RX ORDER — PROPOFOL 10 MG/ML
INJECTION, EMULSION INTRAVENOUS PRN
Status: DISCONTINUED | OUTPATIENT
Start: 2023-08-10 | End: 2023-08-10

## 2023-08-10 RX ORDER — LIDOCAINE 40 MG/G
CREAM TOPICAL
Status: DISCONTINUED | OUTPATIENT
Start: 2023-08-10 | End: 2023-08-10 | Stop reason: HOSPADM

## 2023-08-10 RX ORDER — ONDANSETRON 4 MG/1
4 TABLET, ORALLY DISINTEGRATING ORAL EVERY 30 MIN PRN
Status: DISCONTINUED | OUTPATIENT
Start: 2023-08-10 | End: 2023-08-10 | Stop reason: HOSPADM

## 2023-08-10 RX ORDER — PROPOFOL 10 MG/ML
INJECTION, EMULSION INTRAVENOUS CONTINUOUS PRN
Status: DISCONTINUED | OUTPATIENT
Start: 2023-08-10 | End: 2023-08-10

## 2023-08-10 RX ORDER — FENTANYL CITRATE 50 UG/ML
50 INJECTION, SOLUTION INTRAMUSCULAR; INTRAVENOUS EVERY 5 MIN PRN
Status: DISCONTINUED | OUTPATIENT
Start: 2023-08-10 | End: 2023-08-10 | Stop reason: HOSPADM

## 2023-08-10 RX ORDER — FENTANYL CITRATE 50 UG/ML
25 INJECTION, SOLUTION INTRAMUSCULAR; INTRAVENOUS
Status: DISCONTINUED | OUTPATIENT
Start: 2023-08-10 | End: 2023-08-10 | Stop reason: HOSPADM

## 2023-08-10 RX ORDER — OXYCODONE HYDROCHLORIDE 5 MG/1
10 TABLET ORAL
Status: DISCONTINUED | OUTPATIENT
Start: 2023-08-10 | End: 2023-08-10 | Stop reason: HOSPADM

## 2023-08-10 RX ORDER — ONDANSETRON 2 MG/ML
INJECTION INTRAMUSCULAR; INTRAVENOUS PRN
Status: DISCONTINUED | OUTPATIENT
Start: 2023-08-10 | End: 2023-08-10

## 2023-08-10 RX ORDER — HEPARIN SODIUM 5000 [USP'U]/.5ML
5000 INJECTION, SOLUTION INTRAVENOUS; SUBCUTANEOUS
Status: COMPLETED | OUTPATIENT
Start: 2023-08-10 | End: 2023-08-10

## 2023-08-10 RX ORDER — BUPIVACAINE HYDROCHLORIDE 2.5 MG/ML
INJECTION, SOLUTION EPIDURAL; INFILTRATION; INTRACAUDAL
Status: COMPLETED | OUTPATIENT
Start: 2023-08-10 | End: 2023-08-10

## 2023-08-10 RX ORDER — SODIUM CHLORIDE, SODIUM LACTATE, POTASSIUM CHLORIDE, CALCIUM CHLORIDE 600; 310; 30; 20 MG/100ML; MG/100ML; MG/100ML; MG/100ML
INJECTION, SOLUTION INTRAVENOUS CONTINUOUS
Status: DISCONTINUED | OUTPATIENT
Start: 2023-08-10 | End: 2023-08-10 | Stop reason: HOSPADM

## 2023-08-10 RX ORDER — CEFAZOLIN SODIUM/WATER 2 G/20 ML
2 SYRINGE (ML) INTRAVENOUS
Status: COMPLETED | OUTPATIENT
Start: 2023-08-10 | End: 2023-08-10

## 2023-08-10 RX ORDER — CEFAZOLIN SODIUM/WATER 2 G/20 ML
2 SYRINGE (ML) INTRAVENOUS SEE ADMIN INSTRUCTIONS
Status: DISCONTINUED | OUTPATIENT
Start: 2023-08-10 | End: 2023-08-10 | Stop reason: HOSPADM

## 2023-08-10 RX ORDER — OXYCODONE HYDROCHLORIDE 5 MG/1
5 TABLET ORAL
Status: COMPLETED | OUTPATIENT
Start: 2023-08-10 | End: 2023-08-10

## 2023-08-10 RX ORDER — IBUPROFEN 800 MG/1
800 TABLET, FILM COATED ORAL EVERY 8 HOURS PRN
Qty: 30 TABLET | Refills: 1 | Status: SHIPPED | OUTPATIENT
Start: 2023-08-10

## 2023-08-10 RX ORDER — HALOPERIDOL 5 MG/ML
1 INJECTION INTRAMUSCULAR
Status: DISCONTINUED | OUTPATIENT
Start: 2023-08-10 | End: 2023-08-10 | Stop reason: HOSPADM

## 2023-08-10 RX ORDER — MEPERIDINE HYDROCHLORIDE 25 MG/ML
12.5 INJECTION INTRAMUSCULAR; INTRAVENOUS; SUBCUTANEOUS EVERY 5 MIN PRN
Status: DISCONTINUED | OUTPATIENT
Start: 2023-08-10 | End: 2023-08-10 | Stop reason: HOSPADM

## 2023-08-10 RX ORDER — FENTANYL CITRATE 50 UG/ML
INJECTION, SOLUTION INTRAMUSCULAR; INTRAVENOUS PRN
Status: DISCONTINUED | OUTPATIENT
Start: 2023-08-10 | End: 2023-08-10

## 2023-08-10 RX ORDER — MAGNESIUM SULFATE 4 G/50ML
4 INJECTION INTRAVENOUS ONCE
Status: COMPLETED | OUTPATIENT
Start: 2023-08-10 | End: 2023-08-10

## 2023-08-10 RX ORDER — ONDANSETRON 2 MG/ML
4 INJECTION INTRAMUSCULAR; INTRAVENOUS EVERY 30 MIN PRN
Status: DISCONTINUED | OUTPATIENT
Start: 2023-08-10 | End: 2023-08-10 | Stop reason: HOSPADM

## 2023-08-10 RX ORDER — FENTANYL CITRATE 50 UG/ML
25 INJECTION, SOLUTION INTRAMUSCULAR; INTRAVENOUS EVERY 5 MIN PRN
Status: DISCONTINUED | OUTPATIENT
Start: 2023-08-10 | End: 2023-08-10 | Stop reason: HOSPADM

## 2023-08-10 RX ORDER — ACETAMINOPHEN 325 MG/1
975 TABLET ORAL ONCE
Status: COMPLETED | OUTPATIENT
Start: 2023-08-10 | End: 2023-08-10

## 2023-08-10 RX ORDER — SCOLOPAMINE TRANSDERMAL SYSTEM 1 MG/1
1 PATCH, EXTENDED RELEASE TRANSDERMAL ONCE
Status: DISCONTINUED | OUTPATIENT
Start: 2023-08-10 | End: 2023-08-10 | Stop reason: HOSPADM

## 2023-08-10 RX ORDER — DEXAMETHASONE SODIUM PHOSPHATE 4 MG/ML
INJECTION, SOLUTION INTRA-ARTICULAR; INTRALESIONAL; INTRAMUSCULAR; INTRAVENOUS; SOFT TISSUE PRN
Status: DISCONTINUED | OUTPATIENT
Start: 2023-08-10 | End: 2023-08-10

## 2023-08-10 RX ORDER — OXYCODONE HYDROCHLORIDE 5 MG/1
5 TABLET ORAL EVERY 6 HOURS PRN
Qty: 12 TABLET | Refills: 0 | Status: SHIPPED | OUTPATIENT
Start: 2023-08-10 | End: 2023-08-13

## 2023-08-10 RX ADMIN — PROPOFOL 120 MG: 10 INJECTION, EMULSION INTRAVENOUS at 08:10

## 2023-08-10 RX ADMIN — Medication 2 G: at 08:15

## 2023-08-10 RX ADMIN — ACETAMINOPHEN 975 MG: 325 TABLET ORAL at 06:42

## 2023-08-10 RX ADMIN — MIDAZOLAM 2 MG: 1 INJECTION INTRAMUSCULAR; INTRAVENOUS at 07:54

## 2023-08-10 RX ADMIN — BUPIVACAINE HYDROCHLORIDE 20 ML: 2.5 INJECTION, SOLUTION EPIDURAL; INFILTRATION; INTRACAUDAL at 08:15

## 2023-08-10 RX ADMIN — MAGNESIUM SULFATE HEPTAHYDRATE 4 G: 80 INJECTION, SOLUTION INTRAVENOUS at 07:02

## 2023-08-10 RX ADMIN — FENTANYL CITRATE 50 MCG: 50 INJECTION, SOLUTION INTRAMUSCULAR; INTRAVENOUS at 08:50

## 2023-08-10 RX ADMIN — FENTANYL CITRATE 50 MCG: 50 INJECTION, SOLUTION INTRAMUSCULAR; INTRAVENOUS at 08:10

## 2023-08-10 RX ADMIN — ROCURONIUM BROMIDE 50 MG: 50 INJECTION, SOLUTION INTRAVENOUS at 09:13

## 2023-08-10 RX ADMIN — ONDANSETRON 4 MG: 2 INJECTION INTRAMUSCULAR; INTRAVENOUS at 14:11

## 2023-08-10 RX ADMIN — FENTANYL CITRATE 25 MCG: 50 INJECTION, SOLUTION INTRAMUSCULAR; INTRAVENOUS at 15:11

## 2023-08-10 RX ADMIN — ROCURONIUM BROMIDE 50 MG: 50 INJECTION, SOLUTION INTRAVENOUS at 08:10

## 2023-08-10 RX ADMIN — SUGAMMADEX 200 MG: 100 INJECTION, SOLUTION INTRAVENOUS at 14:19

## 2023-08-10 RX ADMIN — OXYCODONE HYDROCHLORIDE 5 MG: 5 TABLET ORAL at 17:25

## 2023-08-10 RX ADMIN — SCOPALAMINE 1 PATCH: 1 PATCH, EXTENDED RELEASE TRANSDERMAL at 06:42

## 2023-08-10 RX ADMIN — BUPIVACAINE 20 ML: 13.3 INJECTION, SUSPENSION, LIPOSOMAL INFILTRATION at 08:15

## 2023-08-10 RX ADMIN — HEPARIN SODIUM 5000 UNITS: 10000 INJECTION, SOLUTION INTRAVENOUS; SUBCUTANEOUS at 06:57

## 2023-08-10 RX ADMIN — PROPOFOL 30 MCG/KG/MIN: 10 INJECTION, EMULSION INTRAVENOUS at 08:33

## 2023-08-10 RX ADMIN — HYDROMORPHONE HYDROCHLORIDE 1 MG: 1 INJECTION, SOLUTION INTRAMUSCULAR; INTRAVENOUS; SUBCUTANEOUS at 10:47

## 2023-08-10 RX ADMIN — FENTANYL CITRATE 25 MCG: 50 INJECTION, SOLUTION INTRAMUSCULAR; INTRAVENOUS at 15:26

## 2023-08-10 RX ADMIN — ROCURONIUM BROMIDE 50 MG: 50 INJECTION, SOLUTION INTRAVENOUS at 12:48

## 2023-08-10 RX ADMIN — HYDROMORPHONE HYDROCHLORIDE 1 MG: 1 INJECTION, SOLUTION INTRAMUSCULAR; INTRAVENOUS; SUBCUTANEOUS at 09:13

## 2023-08-10 RX ADMIN — Medication 2 G: at 12:36

## 2023-08-10 RX ADMIN — SODIUM CHLORIDE, POTASSIUM CHLORIDE, SODIUM LACTATE AND CALCIUM CHLORIDE: 600; 310; 30; 20 INJECTION, SOLUTION INTRAVENOUS at 07:02

## 2023-08-10 RX ADMIN — SODIUM CHLORIDE, POTASSIUM CHLORIDE, SODIUM LACTATE AND CALCIUM CHLORIDE: 600; 310; 30; 20 INJECTION, SOLUTION INTRAVENOUS at 09:34

## 2023-08-10 RX ADMIN — ROCURONIUM BROMIDE 50 MG: 50 INJECTION, SOLUTION INTRAVENOUS at 10:39

## 2023-08-10 RX ADMIN — DEXAMETHASONE SODIUM PHOSPHATE 10 MG: 4 INJECTION, SOLUTION INTRA-ARTICULAR; INTRALESIONAL; INTRAMUSCULAR; INTRAVENOUS; SOFT TISSUE at 08:51

## 2023-08-10 RX ADMIN — SODIUM CHLORIDE, POTASSIUM CHLORIDE, SODIUM LACTATE AND CALCIUM CHLORIDE: 600; 310; 30; 20 INJECTION, SOLUTION INTRAVENOUS at 13:32

## 2023-08-10 RX ADMIN — HYDROMORPHONE HYDROCHLORIDE 1 MG: 1 INJECTION, SOLUTION INTRAMUSCULAR; INTRAVENOUS; SUBCUTANEOUS at 13:10

## 2023-08-10 ASSESSMENT — ACTIVITIES OF DAILY LIVING (ADL)
ADLS_ACUITY_SCORE: 18

## 2023-08-10 NOTE — H&P
7/28/23 Ashia Bass 1980  HPI: The patient presents for severe pain with her menses as well as abnormal bleeding.  Her and her  also desire pregnancy.  She has had 1 miscarriage.  She has menses every 21-28 days, 4-6 days of bleeding, 2-3 days of premenstrual spotting, up to 2 days of brown bleeding at the end of menses, and 1 or 2 days of intermenstrual bleeding.  She states her menses are very heavy and she bleeds through a pad in less than an hour.  She denies pain or bleeding with bowel movements or urination.  She has 10 out of 10 pain with her menses that would cause her to miss work at least one day per month.  She has pain in her pelvis as well as her back.  The pain causes her to have vomiting and loose stools.  She tried to take oral contraceptive pills for the pain, however she still had to miss work somewhat regularly.  She also has cramping pain with intercourse intermittently.  She knows when she is ovulating, however this pain does not affect her activities of daily living.  She has PMS symptoms in the form of irritability, bloating, carbohydrate cravings, salt cravings, headache, nausea for 3-4 days prior to her menses.  She has some hirsutism and acne. They have tried to cycles of Clomid and 3 cycles of letrozole with about a half of the cycles using trigger with Ovidrel without success.  The 1 pregnancy she achieved was spontaneous.  Of note, starting early in 2023, the patient had a few weeks of irregular bleeding.  Further workup led to increased prolactin an increase FSH.  Once prolactin was controlled, FSH was also normal, and bleeding was more regular.  Per endocrinology, elevated FSH was thought to be due to irregular ovulatory attempts during elevated prolactin.  The patient also has autoimmune disease(relapsing polychondritis) for which she was recently evaluated at [place].    The patient underwent diagnostic surgery on 7/21/23 which revealed endocervicitis, chronic  endometritis, ovarian cyst, intestinal adhesions, endometriosis involving the uterus, ovary, bladder, left ureter, rectum; endocervical and endometrial pathology found mild chronic inflammation of the endocervix; cultures negative.  She returns for robotic surgery.     OB history: , miscarriage managed expectantly at 6-1/2 weeks 2021  GYN history: Denies history of abnormal Pap smears or STDs  Medical history: hyperprolactinemia, TMJ, scoliosis, migraines, relapsing polychondritis  Surgical history: wisdom tooth extraction, treatment of Bartholin cyst 20 ENDOCERVICAL AND ENDOMETRIAL CULTURES AND BIOPSIES,  BILATERAL FALLOPIAN TUBE RECANALIZATION, BILATERAL SELECTIVE HYSTEROSALPINGOGRAM, HYSTEROSCOPY, DIAGNOSTIC LAPAROSCOPY, ENTEROLYSIS  Family history: Denies history of bleeding or clotting disorders  Social history: Denies use of tobacco or drugs, denies use of alcohol  Medications: bromcriptine, PNV, krill oil, tumeric, Ca, Vit D, CoQ10  Allergies: NKDA     Objective:   Vitals: To be completed in preop  Physical Exam: To be completed in preop     Labs: elevated FSH and prolactin, however these have resolved     Ultrasound at [place] 23:  Endo 7 Trilaminar   There is a 1.9x1.6x1.6 cm hypoechoic solid mass with blood flow in the right lateral cervix area which likely represents a fibroid.   AFC 9   Right ovary 18x20 mm follicle   Left ovary 15x14 mm follicle, 20x20 mm follicle      Assessment/plan 42 year-old  with severe dysmenorrhea, abnormal uterine bleeding:   We extensively discussed the procedure of  Robotic guided carbon dioxide laser excision of endometriosis, bilateral ovarian cystectomies, lysis of adhesions, adhesion prevention measures.     We discussed the risks and benefits of surgery including but not limited to the risks of blood loss (the patient consents to blood transfusion if needed), infection (skin or pelvic), injury to other organs (uterus, fallopian tubes,  ovaries, bladder, bowel, ureters, nerves, vessels), hernia, risks of anesthesia (DVT, pulmonary embolism, pneumonia, death).   The patient expressed understanding of the above, all of her questions were answered, and she desires to proceed to surgery.     Marisol Alcantara MD

## 2023-08-10 NOTE — INTERVAL H&P NOTE
Physcial Exam  General: No acute distress  Lungs: CTAB  Cardiac: RRR, no M/R/G auscultated  Abdominal: no pain or organomegaly with palpation  Extremeties: no calf pain or edema

## 2023-08-10 NOTE — OP NOTE
Operative Note   08/10/23    Patient: Ashia Bass 1980     Procedure: Procedure(s):  ROBOTIC GUIDED CARBON DIOXIDE LASER EXCISION AND VAPORIZATION OF ENDOMETRIOSIS, EXCISION OF LESIONS FROM SIGMOID COLON AND RECTUM  BILATERAL OVARIAN CYSTECTOMIES  LYSIS OF ADHESIONS, ENTEROLYSIS, LEFT URETEROLYSIS, ADHESION PREVENTION MEASURES INCLUDING LEFT OVARIAN SUSPENSION     Surgeon(s): Marisol Alcantara MD    Pre-operative Diagnosis:   Abnormal uterine bleeding [N93.9]  Dysmenorrhea [N94.6]     Post-operative Diagnosis:   Same plus:     Ovarian cyst     Intestinal adhesions     Endometriosis, uterus     Endometriosis, ovary     Endometriosis, bladder     Endometriosis of left ureter     Endometriosis, rectum     Endometriosis of sigmoid colon     Anestheisa: general, TAP blocks    Antibiotics: 2g Ancef IV    EBL: 20cc    UOP: 270cc    IV Fluids: 2500cc    Findings:  Laparoscopy  Uterus: White scar on posterior fundus consistent with superficial endometriosis  Right fallopian tube: Normal course, normal fimbriae  Right ovary: Normal size, surface with rugae, half centimeter fibroma on the inferior pole, surface with many clear vesicles consistent with superficial endometriosis  Right pelvic side wall: White scar consistent with superficial endometriosis  Right ureter: Appeared normal  Right broad ligament: Appeared normal  Right uterosacral ligament: Dense right posterior cul-de-sac lesion involves the uterosacral ligament  Posterior cul-de-sac: Dense and deep infiltrating white scar and powder burn lesion in the right superior posterior cul-de-sac consistent with deep infiltrating endometriosis, clear vesicle and powder burn lesion on the left side consistent with superficial endometriosis, all of these lesions directly abut the rectum  Left uterosacral ligament: Red lesion consistent with superficial endometriosis  Left pelvic sidewall: Multiple white scar and halo lesions consistent with superficial  endometriosis; adhesions to the left ovary  Left ureter: Multiple white scarred and halo lesions on the left pelvic sidewall and left pelvic brim directly overlies the left ureter; Additional wide scar and halo lesions consistent with superficial endometriosis were found after taking down the sigmoid adhesions, and multiply of these also directly overlying the ureter  Left broad ligament: Right scar consistent with superficial endometriosis  Left ovary: Normal size, surface with rugae, many clear vesicles consistent with superficial endometriosis, cyst on superior aspect consistent with either 2 cm endometrioma or hemorrhagic cyst, deep infiltrating endometriosis on the underside with adhesions to the left pelvic sidewall  Left fallopian tube: Normal course, normal fimbriae  Left round ligament: Appeared normal  Bladder peritoneum: 2 significant fibrotic patches of endometriosis, 1 on the left, 1 on the right, both appear to be dense, however could be removed without damaging the bladder  Right round ligament: Appeared normal  Abdominal wall: Appeared normal  Appendix: Appeared normal  Cecum: Appeared normal  Terminal ileum: Appeared normal  Omentum: Appeared normal  Gall bladder: Appeared normal  Liver: White scar on the inferior right lobe, otherwise right and left lobes appeared normal  Diaphragm: right and left sides appeared normal  Sigmoid colon: Adhesions to left pelvic sidewall and left pelvic brim, After the adhesions were taken down, multiple clear vesicles and white scars consistent with endometriosis were seen on the left lateral sigmoid  Left pericolic gutter: Halo lesions consistent with superficial endometriosis  Right pericolic gutter: White scar lesions consistent with superficial endometriosis  Rectum: The posterior cul-de-sac lesions directly abut the rectum    Description of Procedure:   After informed consent was obtained, the patient was brought to the operating room.  She was transferred to  the operating room table and underwent general anesthesia.    The patient was then positioned in lithotomy with the yellow fin stirrups for robotic surgery with arms tucked assuring adequate padding to prevent neurovascular compromise.    The patient was prepped and draped on the abdomen and perineum including internal vaginal prep, and the vega catheter was placed. A speculum was placed. The uterus sounded to 8 cm, and thus the 8 cm Paloma tip was applied to the Paloma uterine manipulator and this was placed in the uterus.  The surgeon's gloves were changed and attention was turned to the laparoscopic portion of the case. An incision was made in the umbilicus, and a Kocher clamp was used to grasp the fascia.  The fascia was grasped inferior to this and then lateral to this point on both sides with an additional Kocher. With the fascia significantly elevated, an incision was made in the fascia, and 8mm blunt tipped trocar was placed.  Intra-abdominal placement was confirmed with the laparoscope, and the abdominal cavity was insufflated to a pressure of 15 for port placement. Additional 8 mm robotic ports were placed in the left lower quadrant, right lower quadrant, and left upper quadrant, and an 8mm Airseal port was placed in the right upper quadrant all under direct visualization assuring the inferior epigastrics were not in the line of the trocar. The insufflation pressure was reduced to 10. The robot was then docked.    The CO2 laser was used to perform enterolysis by taking down the adhesions from the sigmoid to the left pelvic sidewall and left pelvic brim. Multiple of these adhesions were directly overlying the ureter, thus left ureterolysis was required to fully remove these adhesions. Careful attention was paid to monitor the course of the left ureter and assure that the dissection did not compromise the ureter. Multiple additional lesions of endometriosis on the left ureter, left pelvic sidewall, and left  lateral sigmoid were found after these adhesions were taken down.  The CO2 laser was used to circumscribe and excise lesions of endometriosis, and these were sent to pathology as in the specimen list below. Due to the extensive amount of endometriosis, this dissection and excision required significant time and attention. Due to the endometriosis overlying the left ureter in many locations, left ureterolysis was again required to fully excise this endometriosis.  The endometriosis lesions on the sigmoid and rectum were carefully removed with the CO2 laser, assuring that the muscularis was not entered with the dissection. The deep endometriosis underneath the left ovary was carefully excised with the CO2 laser, using a 5-0 PDS sutures to achieve hemostasis.  The adhesions from the left ovary to the left pelvic sidewall were completely lysed and excised in this process.  Attention was then turned to removing the cyst that appeared consistent with endometrioma in the left ovary.  The cyst was carefully drained of the chocolate fluid, and the pelvis was irrigated and suctioned multiple times to assure this fluid was removed in its entirety.  The cyst wall was then carefully dissected out of the ovary, and it was assured that the cyst wall was removed in its entirety. Endometriosis lesions less than 1-2 mm that were isolated were laser vaporized. All of the endometriosis that could be seen was excised or vaporized for full and thorough treatment.     Attention was then turned to the right ovarian cystectomy, the fibroma on the inferior pole of the right ovary was excised with the CO2 laser.    The significant areas that were dissected or excised were repaired with a running imbricating stitch with 4-0 V lock on the peritoneum and 5-0 PDS imbricating figure-of-eight sutures in smaller defects to assure all cut edges were imbricated and only smooth surfaces were showing to assist with adhesion prevention. The left ovary was  reconstructed using multiple layers of 4-0 V lock suture, first in the medulla, then an imbricating suture in the cortex to assure only smooth edges were showing at the conclusion of the repair. As the peritoneal defects directly overlying the left ureter were unable to be closed, as the ureter would have been constricted if they were closed, a left ovarian suspension was completed to temporarily relieve the left ovary from the chance of scarring to the left pelvic sidewall while the peritoneal defects healed.  2-0 Monocryl suture was placed around the left round ligament and through the left ovary.  This was secured as an interrupted suture. A second interrupted suture was placed in a similar fashion.  Both sutures were placed assuring there was no constriction on the fallopian tube, ovary, or round ligament.  It was also assured that there was not space for the bowel to pass between the round ligament and ovary.     Thorough irrigation took place throughout the case, and at the conclusion of the case the irrigation was clear.  The robot was undocked. The irrigation fluid near the liver was suctioned, and the area irrigated and suctioned until the fluid was clear. A Seprafilm slurry was placed in the pelvis at the conclusion of the surgery to assist with adhesion prevention. The intra-abdominal gas was allowed to escape through the trocars including multiple Valsalvas to assist with gas removal.  The trocars were removed. The fascia of the umbilical incision was closed with a figure-of-eight stitch with 0 Vicryl.  The skin incisions were closed with a subcuticular stitch with 4-0 Vicryl.  The incisions were covered with Telfa, a folded 2x2 gauze, and a Tegaderm for pressure dressings. The uterine manipulator and Mccarthy were removed. Instrument and sponge counts were correct. The patient was cleaned and taken to the recovery room in stable condition.  She tolerated the procedure well.    Specimens:    ID Type Source  Tests Collected by Time Destination   1 : SIGMOID ADHESION #1 Tissue Large Intestine, Colon, Sigmoid SURGICAL PATHOLOGY EXAM Marisol Alcantara MD 8/10/2023  9:24 AM    2 : SIGMOID ADHESION #2 POSSIBLE ENDOMETRIOSIS Tissue Large Intestine, Colon, Sigmoid SURGICAL PATHOLOGY EXAM Marisol Alcantara MD 8/10/2023  9:25 AM    3 : SIGMOID ENDOMETRIOSIS Tissue Large Intestine, Colon, Sigmoid SURGICAL PATHOLOGY EXAM Marisol Alcantara MD 8/10/2023  9:26 AM    4 : SIGMOID ENDOMETRIOSIS #2 Tissue Large Intestine, Colon, Sigmoid SURGICAL PATHOLOGY EXAM Marisol Alcantara MD 8/10/2023  9:30 AM    5 : SIGMOID ADHESION #3 Tissue Large Intestine, Colon, Sigmoid SURGICAL PATHOLOGY EXAM Marisol Alcantara MD 8/10/2023  9:37 AM    6 : SIGMOID ENDOMETRIOSIS #3 Tissue Large Intestine, Colon, Sigmoid SURGICAL PATHOLOGY EXAM Marisol Alcantara MD 8/10/2023  9:45 AM    7 : LEFT URETER ENDOMETRIOSIS #1 Tissue Ureter, Left SURGICAL PATHOLOGY EXAM Marisol Alcantara MD 8/10/2023  9:46 AM    8 : LEFT PERICOLIC GUTTER ENDOMETRIOSIS #1 Tissue Large Intestine, Colon SURGICAL PATHOLOGY EXAM Marisol Alcantara MD 8/10/2023  9:49 AM    9 : LEFT PERICOLIC GUTTER ENDOMETRIOSIS #2 Tissue Large Intestine, Colon SURGICAL PATHOLOGY EXAM Marisol Alcantara MD 8/10/2023  9:50 AM    10 : LEFT URETER ENDOMETRIOSIS #2 Tissue Ureter, Left SURGICAL PATHOLOGY EXAM Marisol Alcantara MD 8/10/2023  9:51 AM    11 : LEFT UTEROSACRAL LIGAMENT ENDOMETRIOSIS Tissue Uterus SURGICAL PATHOLOGY EXAM Marsiol Alcantara MD 8/10/2023 10:09 AM    12 : RECTUM ENDOMETRIOSIS #1 Tissue Rectum SURGICAL PATHOLOGY EXAM Marisol Alcantara MD 8/10/2023 10:14 AM    13 : RIGHT RECTUM AND POSTERIOR CERVIX ENDOMETRIOSIS Tissue Rectum SURGICAL PATHOLOGY EXAM Marisol Alcantara MD 8/10/2023 10:16 AM    14 : LEFT RECTUM ENDOMETRIOSIS Tissue Rectum SURGICAL PATHOLOGY EXAM Marisol Alcantara MD 8/10/2023 10:21 AM    15 : LEFT URETER ENDOMETRIOSIS #3 Tissue Ureter,  Left SURGICAL PATHOLOGY EXAM Marisol Alcantara MD 8/10/2023 10:26 AM    16 : LEFT BROAD LIGAMENT ENDOMETRIOSIS Tissue Pelvis, Left SURGICAL PATHOLOGY EXAM Marisol Alcantara MD 8/10/2023 10:29 AM    17 : LEFT PELVIC SIDEWALL ADHESION AND ENDOMETRIOSIS Tissue Pelvis, Left SURGICAL PATHOLOGY EXAM Marisol Alcantara MD 8/10/2023 10:37 AM    18 : LEFT OVARY ENDOMETRIOSIS #1 Tissue Ovary, Left SURGICAL PATHOLOGY EXAM Marisol Alcantara MD 8/10/2023 10:48 AM    19 : LEFT OVARY ENDOMETRIOSIS #2 Tissue Ovary, Left SURGICAL PATHOLOGY EXAM Marisol Alcantara MD 8/10/2023 10:55 AM    20 : LEFT OVARIAN CYST Cyst Ovary, Left SURGICAL PATHOLOGY EXAM Marisol Alcantara MD 8/10/2023 10:57 AM    21 : RIGHT OVARIAN FIBROMA Tissue Ovary, Right SURGICAL PATHOLOGY EXAM Marisol Alcantara MD 8/10/2023 11:47 AM    22 : RIGHT PELVIC SIDEWALL ENDOMETRIOSIS Tissue Pelvis, Right SURGICAL PATHOLOGY EXAM Marisol Alcantara MD 8/10/2023 11:55 AM    23 : MIDDLE BLADDER ENDOMETRIOSIS Tissue Urinary Bladder SURGICAL PATHOLOGY EXAM Marisol Alcantara MD 8/10/2023 12:03 PM    24 : LEFT BLADDER ENDOMETRIOSIS Tissue Urinary Bladder SURGICAL PATHOLOGY EXAM Marisol Alcantara MD 8/10/2023 12:07 PM    25 : RIGHT BLADDER ENDOMETRIOSIS Tissue Urinary Bladder SURGICAL PATHOLOGY EXAM Marisol Alcantara MD 8/10/2023 12:17 PM      Drains: vega catheter (removed at conclusion of procedure), uterine manipulator (removed at conclusion of procedure)     Implants: none    Complications: none    Marisol Alcantara MD

## 2023-08-10 NOTE — ANESTHESIA PREPROCEDURE EVALUATION
Anesthesia Pre-Procedure Evaluation    Patient: Ashia Bass   MRN: [id number] : 1980        Procedure : Procedure(s):  ROBOTIC GUIDED CARBON DIOXIDE LASER EXCISION OF ENDOMETRIOSIS,  BILATERAL OVARIAN CYSTECTOMIES,  LYSIS OF ADHESIONS, ADHESION PREVENTION MEASURES,  POSSIBLE ENDOCERVICAL AND ENDOMETRIAL CULTURES AND BIOPSIES          Past Medical History:   Diagnosis Date    Endometriosis     Female infertility     Pituitary adenoma (H)     Relapsing polychondritis     Scoliosis     TMJ (temporomandibular joint syndrome)       Past Surgical History:   Procedure Laterality Date    COLONOSCOPY      CYST REMOVAL      LAPAROSCOPY DIAGNOSTIC (GYN) N/A 2023    Procedure: ENDOCERVICAL AND ENDOMETRIAL CULTURES AND BIOPSIES,  BILATERAL FALLOPIAN TUBE RECANALIZATION, BILATERAL SELECTIVE HYSTEROSALPINGOGRAM, HYSTEROSCOPY, DIAGNOSTIC LAPAROSCOPY, ENTEROLYSIS;  Surgeon: Marisol Alcantara MD;  Location: [facility] OR      No Known Allergies   Social History     Tobacco Use    Smoking status: Never    Smokeless tobacco: Never   Substance Use Topics    Alcohol use: Never      Wt Readings from Last 1 Encounters:   08/10/23 56.5 kg (124 lb 8 oz)        Anesthesia Evaluation   Pt has had prior anesthetic.     No history of anesthetic complications       ROS/MED HX  ENT/Pulmonary:  - neg pulmonary ROS     Neurologic: Comment: Pituitary adenoma on bromocriptine       Cardiovascular:  - neg cardiovascular ROS     METS/Exercise Tolerance:     Hematologic:  - neg hematologic  ROS     Musculoskeletal: Comment: Relapsing polychondritis, no recent flares or steroid use       GI/Hepatic:  - neg GI/hepatic ROS     Renal/Genitourinary:  - neg Renal ROS     Endo:  - neg endo ROS     Psychiatric/Substance Use:  - neg psychiatric ROS     Infectious Disease:  - neg infectious disease ROS     Malignancy:  - neg malignancy ROS     Other:  - neg other ROS          Physical Exam    Airway  airway exam normal      Mallampati:  II   TM distance: > 3 FB   Neck ROM: full   Mouth opening: > 3 cm    Respiratory Devices and Support         Dental  no notable dental history     (+) Minor Abnormalities - some fillings, tiny chips      Cardiovascular   cardiovascular exam normal       Rhythm and rate: regular and normal     Pulmonary   pulmonary exam normal        breath sounds clear to auscultation           OUTSIDE LABS:  CBC: No results found for: WBC, HGB, HCT, PLT  BMP: No results found for: NA, POTASSIUM, CHLORIDE, CO2, BUN, CR, GLC  COAGS: No results found for: PTT, INR, FIBR  POC:   Lab Results   Component Value Date    HCG Negative 07/21/2023     HEPATIC: No results found for: ALBUMIN, PROTTOTAL, ALT, AST, GGT, ALKPHOS, BILITOTAL, BILIDIRECT, RON  OTHER: No results found for: PH, LACT, A1C, LILIA, PHOS, MAG, LIPASE, AMYLASE, TSH, T4, T3, CRP, SED    Anesthesia Plan    ASA Status:  2    NPO Status:  NPO Appropriate    Anesthesia Type: General.     - Airway: ETT   Induction: Intravenous, Propofol.   Maintenance: Balanced.        Consents    Anesthesia Plan(s) and associated risks, benefits, and realistic alternatives discussed. Questions answered and patient/representative(s) expressed understanding.     - Discussed:     - Discussed with:  Patient            Postoperative Care    Pain management: IV analgesics, Oral pain medications, Multi-modal analgesia.   PONV prophylaxis: Ondansetron (or other 5HT-3), Dexamethasone or Solumedrol, Droperidol or Haldol, Background Propofol Infusion     Comments:    Other Comments: Gen ETT  Mag gtt  Background propofol infusion   Post induction TAP blocks  Decadron and zofran and scop patch             SHANTEL MENA MD

## 2023-08-10 NOTE — ANESTHESIA PROCEDURE NOTES
TAP Procedure Note    Pre-Procedure   Staff -        Anesthesiologist:  Jose Rafael Hansen MD       Performed By: anesthesiologist       Location: pre-op       Procedure Start/Stop Times: 8/10/2023 8:15 AM and 8/10/2023 8:18 AM       Pre-Anesthestic Checklist: patient identified, IV checked, site marked, risks and benefits discussed, informed consent, monitors and equipment checked, pre-op evaluation, at physician/surgeon's request and post-op pain management  Timeout:       Correct Patient: Yes        Correct Procedure: Yes        Correct Site: Yes        Correct Position: Yes        Correct Laterality: Yes        Site Marked: Yes  Procedure Documentation  Procedure: TAP       Laterality: bilateral       Patient Position: supine       Patient Prep/Sterile Barriers: sterile gloves, mask       Skin prep: Chloraprep       Needle Type: insulated       Needle Gauge: 20.        Needle Length (Inches): 4        Ultrasound guided       1. Ultrasound was used to identify targeted nerve, plexus, vascular marker, or fascial plane and place a needle adjacent to it in real-time.       2. Ultrasound was used to visualize the spread of anesthetic in close proximity to the above referenced structure.       3. A permanent image is entered into the patient's record.       4. The visualized anatomic structures appeared normal.       5. There were no apparent abnormal pathologic findings.    Assessment/Narrative         The placement was negative for: blood aspirated, painful injection and site bleeding       Paresthesias: No.       Bolus given via needle..        Secured via.        Insertion/Infusion Method: Single Shot       Complications: none       Injection made incrementally with aspirations every 5 mL.    Medication(s) Administered   Bupivacaine 0.25% PF (Infiltration) - Infiltration   20 mL - 8/10/2023 8:15:00 AM  Bupivacaine liposome (Exparel) 1.3% LA inj susp (Infiltration) - Infiltration   20 mL - 8/10/2023 8:15:00  "AM  Medication Administration Time: 8/10/2023 8:15 AM     Comments:  Negative aspiration every 5 ml of medication administered. No signs of LAST. No pain or paresthesias with block placement. No complications.     10 ml of 0.25% bupivacaine and 10 ml of exparel injected bilaterally        FOR [facility] ONLY:   Pain Team Contact information: please page the Pain Team Via [email or web address]. Search \"Pain\". During daytime hours, please page the attending first. At night please page the resident first.      "

## 2023-08-10 NOTE — ANESTHESIA PROCEDURE NOTES
Airway       Patient location during procedure: OR       Procedure Start/Stop Times: 8/10/2023 8:12 AM  Staff -        CRNA: Jan Gee APRN CRNA       Performed By: CRNA  Consent for Airway        Urgency: elective  Indications and Patient Condition       Indications for airway management: sarwat-procedural       Induction type:intravenous       Mask difficulty assessment: 1 - vent by mask    Final Airway Details       Final airway type: endotracheal airway       Successful airway: ETT - single  Endotracheal Airway Details        ETT size (mm): 7.0       Cuffed: yes       Successful intubation technique: direct laryngoscopy       DL Blade Type: Ospina 3       Grade View of Cords: 1       Adjucts: stylet       Position: Right       Measured from: lips       Secured at (cm): 19       Bite block used: None    Post intubation assessment        Placement verified by: capnometry, equal breath sounds and chest rise        Number of attempts at approach: 1       Number of other approaches attempted: 0       Secured with: silk tape       Ease of procedure: easy       Dentition: Intact and Unchanged       Dental guard used and removed. Dental Guard Type: Proguard Red.    Medication(s) Administered   Medication Administration Time: 8/10/2023 8:12 AM

## 2023-08-10 NOTE — DISCHARGE INSTRUCTIONS
Discharge Instructions from Dr. Alcantara    Pain:  Take (at the same time or alternating if you prefer) 800 mg of ibuprofen every 8 hours and 1000 mg of Tylenol every 8 hours.   Use a heating pad for shoulder pain if you experience this.  This is from the gas that was used to fill your abdomen during the time of surgery.   Use ice packs on your incisions if they are painful.  Your abdominal muscles may be sore (feeling like to exercised them) for several days follow surgery. This is due to the gas that was used to fill your abdomen at the time of surgery and is a normal sensation following surgery.   You may have mild swelling in the face/upper half of the body for the 24 hours following surgery. This is from being in trendelenberg position (upper part of the body tilted slightly down during surgery for best view of the pelvis). The swelling should resolve within 24 hours following surgery.  You may have a sore throat from the breathing tube used during surgery. This should resolve within a few days following surgery.     Of note, the first 2-3 menses and ovulations following surgery may be more painful than they will be in the future once you are completely healed due to the inflammation involved in healing. Consider the 3rd or 4th menses after surgery as the new baseline for you if you are having more pain in the first few cycles.     Incisions:  The day after the surgery, you can shower and remove the bandages over the incisions and clean the incisions gently with soap and water. Bandages do not need to be placed over the incisions after this; the incisions should be open to the air.  The incision in or near the bellybutton as well as other incisions should be cleaned 1-2 times per day and dried very well.    It is recommended that the incision in or near the bellybutton is dried with a blow dryer as it usually can not be dried completely with a towel.   Keeping the incisions clean and dry will decrease the chance  of infection.   It is not required, but some patients have found that an abdominal binder can make the incisions more comfortable with movement.    Activity:  Be sure to walk the length of a football field at least 3 times per day everyday following surgery and at least once the night of surgery. This will help to prevent complications and help you to recover faster.   Restrict lifting to less than 20 pounds for 2 weeks and less than 40 pounds for 4 weeks.  Do not submerge in water (bath in bathtub, swimming in pool, swimming in lake, etc.) for 2 weeks.  Showering is fine and encouraged.   Do not drive while you have significant pain. Before driving, sit in the car with it turned off and press the brakes quickly; if you are able to do this without significant pain, it is ok to drive.   Do not drive if you are taking narcotic pain medications (oxycodone, hydrocodone); wait until 24 hours after the last dose.  Activities other than those listed as not to do above are okay to do following surgery.    Nutrition:  It is very important to consume protein in order to recover well from surgery.  Your body needs protein to heal after surgery.  The recommended amount is 0.7g to 0.9g per pound of body weight per day for 3 months after surgery. Multiply your body weight in pounds by 0.9 grams, and this is the goal amount of protein intake per day.  Multiply your body weight in pounds by 0.7 grams, and this is the minimal amount of protein intake per day recommended for recovering from surgery. It is okay if you do not get this much some days, this is an estimated goal. Having protein in a drink can be helpful such as bone broth (be sure to check that there is a good amount of protein in it) or protein supplement shakes. Eating smaller amounts more frequently may also be helpful.  Please also be sure to drink at least 2 liters of liquids every day.    Stool Softener:  If you do not have a regular bowel movement by the day after  surgery, please take a stool softener such as colace (docusate sodium) 100mg one to two times per day until you have at least one regular soft bowel movement per day.     Call [phone] for questions during the day and for emergencies on nights and weekends.  Please call if these things occur:  Fever of 100.4 Fahrenheit degrees or higher.  Pus draining from the incisions or red incisions (clear fluid or blood from the incisions is okay as long as it is a minimal amount).  Significant swelling in one leg.  Difficulty with urination or unable to urinate for a period of more than 6 hours.  Nausea or vomiting that makes you unable to eat for more than a day.   Heavy bleeding greater than the amount of a normal period (spotting with or without very small pieces of tissue for a few days following the procedure is normal; you may also notice a small amount of gel that is used in placement of the catheter that drains your bladder during surgery).      Marisol Alcantara MD

## 2023-08-10 NOTE — ANESTHESIA CARE TRANSFER NOTE
Patient: Ashia Bass    Procedure: Procedure(s):  ROBOTIC GUIDED CARBON DIOXIDE LASER EXCISION AND VAPORIZATION OF ENDOMETRIOSIS, EXCISION OF LESIONS FROM SIGMOID COLON AND RECTUM  BILATERAL OVARIAN CYSTECTOMIES  LYSIS OF ADHESIONS, ENTEROLYSIS, LEFT URETEROLYSIS, ADHESION PREVENTION MEASURES INCLUDING LEFT OVARIAN SUSPENSION       Diagnosis: Abnormal uterine bleeding [N93.9]  Dysmenorrhea [N94.6]  Diagnosis Additional Information: No value filed.    Anesthesia Type:   General     Note:    Oropharynx: oropharynx clear of all foreign objects and spontaneously breathing  Level of Consciousness: drowsy  Oxygen Supplementation: face mask  Level of Supplemental Oxygen (L/min / FiO2): 6  Independent Airway: airway patency satisfactory and stable  Dentition: dentition unchanged  Vital Signs Stable: post-procedure vital signs reviewed and stable  Report to RN Given: handoff report given  Patient transferred to: PACU    Handoff Report: Identifed the Patient, Identified the Reponsible Provider, Reviewed the pertinent medical history, Discussed the surgical course, Reviewed Intra-OP anesthesia mangement and issues during anesthesia, Set expectations for post-procedure period and Allowed opportunity for questions and acknowledgement of understanding      Vitals:  Vitals Value Taken Time   BP     Temp     Pulse 80 08/10/23 1431   Resp     SpO2 100 % 08/10/23 1431   Vitals shown include unvalidated device data.    Electronically Signed By: DELICIA Jansen CRNA  August 10, 2023  2:32 PM

## 2023-08-10 NOTE — BRIEF OP NOTE
[facility]    Brief Operative Note      Pre-operative diagnosis: Abnormal uterine bleeding [N93.9]  Dysmenorrhea [N94.6]  Post-operative diagnosis See op note    Procedure: Procedure(s):  ROBOTIC GUIDED CARBON DIOXIDE LASER EXCISION AND VAPORIZATION OF ENDOMETRIOSIS, EXCISION OF LESIONS FROM SIGMOID COLON AND RECTUM  BILATERAL OVARIAN CYSTECTOMIES  LYSIS OF ADHESIONS, ENTEROLYSIS, LEFT URETEROLYSIS, ADHESION PREVENTION MEASURES INCLUDING LEFT OVARIAN SUSPENSION  Surgeon: Surgeon(s) and Role:     * Marisol Alcantara MD - Primary  Anesthesia: General with Block   Estimated Blood Loss: 20cc    Drains: None  Specimens:   ID Type Source Tests Collected by Time Destination   1 : SIGMOID ADHESION #1 Tissue Large Intestine, Colon, Sigmoid SURGICAL PATHOLOGY EXAM Marisol Alcantara MD 8/10/2023  9:24 AM    2 : SIGMOID ADHESION #2 POSSIBLE ENDOMETRIOSIS Tissue Large Intestine, Colon, Sigmoid SURGICAL PATHOLOGY EXAM Marisol Alcantara MD 8/10/2023  9:25 AM    3 : SIGMOID ENDOMETRIOSIS Tissue Large Intestine, Colon, Sigmoid SURGICAL PATHOLOGY EXAM Marisol Alcantara MD 8/10/2023  9:26 AM    4 : SIGMOID ENDOMETRIOSIS #2 Tissue Large Intestine, Colon, Sigmoid SURGICAL PATHOLOGY EXAM Marisol Alcantara MD 8/10/2023  9:30 AM    5 : SIGMOID ADHESION #3 Tissue Large Intestine, Colon, Sigmoid SURGICAL PATHOLOGY EXAM Marisol Alcantara MD 8/10/2023  9:37 AM    6 : SIGMOID ENDOMETRIOSIS #3 Tissue Large Intestine, Colon, Sigmoid SURGICAL PATHOLOGY EXAM Marisol Alcantara MD 8/10/2023  9:45 AM    7 : LEFT URETER ENDOMETRIOSIS #1 Tissue Ureter, Left SURGICAL PATHOLOGY EXAM Marisol Alcantara MD 8/10/2023  9:46 AM    8 : LEFT PERICOLIC GUTTER ENDOMETRIOSIS #1 Tissue Large Intestine, Colon SURGICAL PATHOLOGY EXAM Marisol Alcantara MD 8/10/2023  9:49 AM    9 : LEFT PERICOLIC GUTTER ENDOMETRIOSIS #2 Tissue Large Intestine, Colon SURGICAL PATHOLOGY EXAM Marisol Alcantara MD 8/10/2023  9:50 AM     10 : LEFT URETER ENDOMETRIOSIS #2 Tissue Ureter, Left SURGICAL PATHOLOGY EXAM Marisol Alcantara MD 8/10/2023  9:51 AM    11 : LEFT UTEROSACRAL LIGAMENT ENDOMETRIOSIS Tissue Uterus SURGICAL PATHOLOGY EXAM Marisol Alcantara MD 8/10/2023 10:09 AM    12 : RECTUM ENDOMETRIOSIS #1 Tissue Rectum SURGICAL PATHOLOGY EXAM Marisol Alcantara MD 8/10/2023 10:14 AM    13 : RIGHT RECTUM AND POSTERIOR CERVIX ENDOMETRIOSIS Tissue Rectum SURGICAL PATHOLOGY EXAM Marisol Alcantara MD 8/10/2023 10:16 AM    14 : LEFT RECTUM ENDOMETRIOSIS Tissue Rectum SURGICAL PATHOLOGY EXAM Marisol Alcantara MD 8/10/2023 10:21 AM    15 : LEFT URETER ENDOMETRIOSIS #3 Tissue Ureter, Left SURGICAL PATHOLOGY EXAM Marisol Alcantara MD 8/10/2023 10:26 AM    16 : LEFT BROAD LIGAMENT ENDOMETRIOSIS Tissue Pelvis, Left SURGICAL PATHOLOGY EXAM Marisol Alcantara MD 8/10/2023 10:29 AM    17 : LEFT PELVIC SIDEWALL ADHESION AND ENDOMETRIOSIS Tissue Pelvis, Left SURGICAL PATHOLOGY EXAM Marisol Alcantara MD 8/10/2023 10:37 AM    18 : LEFT OVARY ENDOMETRIOSIS #1 Tissue Ovary, Left SURGICAL PATHOLOGY EXAM Marisol Alcantara MD 8/10/2023 10:48 AM    19 : LEFT OVARY ENDOMETRIOSIS #2 Tissue Ovary, Left SURGICAL PATHOLOGY EXAM Marisol Alcantara MD 8/10/2023 10:55 AM    20 : LEFT OVARIAN CYST Cyst Ovary, Left SURGICAL PATHOLOGY EXAM Marisol Alcantara MD 8/10/2023 10:57 AM    21 : RIGHT OVARIAN FIBROMA Tissue Ovary, Right SURGICAL PATHOLOGY EXAM Marisol Alcantara MD 8/10/2023 11:47 AM    22 : RIGHT PELVIC SIDEWALL ENDOMETRIOSIS Tissue Pelvis, Right SURGICAL PATHOLOGY EXAM Marisol Alcantara MD 8/10/2023 11:55 AM    23 : MIDDLE BLADDER ENDOMETRIOSIS Tissue Urinary Bladder SURGICAL PATHOLOGY EXAM Marisol Alcantara MD 8/10/2023 12:03 PM    24 : LEFT BLADDER ENDOMETRIOSIS Tissue Urinary Bladder SURGICAL PATHOLOGY EXAM Marisol Alcantara MD 8/10/2023 12:07 PM    25 : RIGHT BLADDER ENDOMETRIOSIS Tissue Urinary Bladder SURGICAL PATHOLOGY  EXAM Marisol Alcantara MD 8/10/2023 12:17 PM      Findings:   See op note .  Complications: None.  Implants: * No implants in log *

## 2023-08-12 PROBLEM — N80.529 ENDOMETRIOSIS OF SIGMOID COLON: Status: ACTIVE | Noted: 2023-08-12

## 2023-08-17 LAB
PATH REPORT.COMMENTS IMP SPEC: NORMAL
PATH REPORT.FINAL DX SPEC: NORMAL
PATH REPORT.GROSS SPEC: NORMAL
PATH REPORT.MICROSCOPIC SPEC OTHER STN: NORMAL
PATH REPORT.RELEVANT HX SPEC: NORMAL
PHOTO IMAGE: NORMAL

## 2023-08-18 LAB — BACTERIA TISS BX CULT: NO GROWTH

## 2024-03-14 ENCOUNTER — TRANSCRIBE ORDERS (OUTPATIENT)
Dept: OTHER | Age: 44
End: 2024-03-14

## 2024-03-14 DIAGNOSIS — M94.1 RELAPSING POLYCHONDRITIS: Primary | ICD-10-CM

## 2024-10-23 DIAGNOSIS — O03.9 MISCARRIAGE: Primary | ICD-10-CM

## 2024-10-24 ENCOUNTER — ANESTHESIA EVENT (OUTPATIENT)
Dept: SURGERY | Facility: HOSPITAL | Age: 44
End: 2024-10-24
Payer: COMMERCIAL

## 2024-10-24 RX ORDER — NALOXONE HYDROCHLORIDE 1 MG/ML
0.1 INJECTION INTRAMUSCULAR; INTRAVENOUS; SUBCUTANEOUS
Status: CANCELLED | OUTPATIENT
Start: 2024-10-24

## 2024-10-24 RX ORDER — ONDANSETRON 4 MG/1
4 TABLET, ORALLY DISINTEGRATING ORAL EVERY 30 MIN PRN
Status: CANCELLED | OUTPATIENT
Start: 2024-10-24

## 2024-10-24 RX ORDER — HYDROMORPHONE HCL IN WATER/PF 6 MG/30 ML
0.4 PATIENT CONTROLLED ANALGESIA SYRINGE INTRAVENOUS EVERY 5 MIN PRN
Status: CANCELLED | OUTPATIENT
Start: 2024-10-24

## 2024-10-24 RX ORDER — HYDROMORPHONE HCL IN WATER/PF 6 MG/30 ML
0.2 PATIENT CONTROLLED ANALGESIA SYRINGE INTRAVENOUS EVERY 5 MIN PRN
Status: CANCELLED | OUTPATIENT
Start: 2024-10-24

## 2024-10-24 RX ORDER — DEXAMETHASONE SODIUM PHOSPHATE 10 MG/ML
4 INJECTION, SOLUTION INTRAMUSCULAR; INTRAVENOUS
Status: CANCELLED | OUTPATIENT
Start: 2024-10-24

## 2024-10-24 RX ORDER — FENTANYL CITRATE 50 UG/ML
25 INJECTION, SOLUTION INTRAMUSCULAR; INTRAVENOUS EVERY 5 MIN PRN
Status: CANCELLED | OUTPATIENT
Start: 2024-10-24

## 2024-10-24 RX ORDER — ONDANSETRON 2 MG/ML
4 INJECTION INTRAMUSCULAR; INTRAVENOUS EVERY 30 MIN PRN
Status: CANCELLED | OUTPATIENT
Start: 2024-10-24

## 2024-10-24 RX ORDER — FENTANYL CITRATE 50 UG/ML
50 INJECTION, SOLUTION INTRAMUSCULAR; INTRAVENOUS EVERY 5 MIN PRN
Status: CANCELLED | OUTPATIENT
Start: 2024-10-24

## 2024-10-25 ENCOUNTER — ANESTHESIA (OUTPATIENT)
Dept: SURGERY | Facility: HOSPITAL | Age: 44
End: 2024-10-25
Payer: COMMERCIAL

## 2024-10-25 ENCOUNTER — HOSPITAL ENCOUNTER (OUTPATIENT)
Facility: HOSPITAL | Age: 44
Discharge: HOME | End: 2024-10-25
Attending: OBSTETRICS & GYNECOLOGY | Admitting: OBSTETRICS & GYNECOLOGY
Payer: COMMERCIAL

## 2024-10-25 ENCOUNTER — HOSPITAL ENCOUNTER (OUTPATIENT)
Dept: ULTRASOUND IMAGING | Facility: HOSPITAL | Age: 44
Discharge: HOME | End: 2024-10-25
Attending: OBSTETRICS & GYNECOLOGY | Admitting: OBSTETRICS & GYNECOLOGY
Payer: COMMERCIAL

## 2024-10-25 VITALS
SYSTOLIC BLOOD PRESSURE: 107 MMHG | DIASTOLIC BLOOD PRESSURE: 56 MMHG | RESPIRATION RATE: 16 BRPM | OXYGEN SATURATION: 100 % | TEMPERATURE: 98 F | HEART RATE: 75 BPM | BODY MASS INDEX: 21.56 KG/M2 | WEIGHT: 141.8 LBS

## 2024-10-25 DIAGNOSIS — O03.9 MISCARRIAGE: ICD-10-CM

## 2024-10-25 LAB
ABO/RH(D): ABNORMAL
ABO/RH(D): NORMAL
ANTIBODY ID: NORMAL
ANTIBODY SCREEN, TUBE: NORMAL
ANTIBODY SCREEN: POSITIVE
BASOPHILS # BLD AUTO: 0.1 10E3/UL (ref 0–0.2)
BASOPHILS NFR BLD AUTO: 1 %
BLD PROD TYP BPU: NORMAL
BLD PROD TYP BPU: NORMAL
BLOOD COMPONENT TYPE: NORMAL
BLOOD COMPONENT TYPE: NORMAL
CODING SYSTEM: NORMAL
CODING SYSTEM: NORMAL
CROSSMATCH: NORMAL
CROSSMATCH: NORMAL
EOSINOPHIL # BLD AUTO: 0.3 10E3/UL (ref 0–0.7)
EOSINOPHIL NFR BLD AUTO: 3 %
ERYTHROCYTE [DISTWIDTH] IN BLOOD BY AUTOMATED COUNT: 11.8 % (ref 10–15)
FETAL BLEED SCREEN: NORMAL
HCG INTACT+B SERPL-ACNC: 8517 MIU/ML
HCT VFR BLD AUTO: 39.4 % (ref 35–47)
HGB BLD-MCNC: 13.4 G/DL (ref 11.7–15.7)
IMM GRANULOCYTES # BLD: 0 10E3/UL
IMM GRANULOCYTES NFR BLD: 0 %
LYMPHOCYTES # BLD AUTO: 2.1 10E3/UL (ref 0.8–5.3)
LYMPHOCYTES NFR BLD AUTO: 27 %
MCH RBC QN AUTO: 29.7 PG (ref 26.5–33)
MCHC RBC AUTO-ENTMCNC: 34 G/DL (ref 31.5–36.5)
MCV RBC AUTO: 87 FL (ref 78–100)
MONOCYTES # BLD AUTO: 0.6 10E3/UL (ref 0–1.3)
MONOCYTES NFR BLD AUTO: 7 %
NEUTROPHILS # BLD AUTO: 4.9 10E3/UL (ref 1.6–8.3)
NEUTROPHILS NFR BLD AUTO: 62 %
NRBC # BLD AUTO: 0 10E3/UL
NRBC BLD AUTO-RTO: 0 /100
PLATELET # BLD AUTO: 226 10E3/UL (ref 150–450)
RBC # BLD AUTO: 4.51 10E6/UL (ref 3.8–5.2)
SPECIMEN EXPIRATION DATE: ABNORMAL
SPECIMEN EXPIRATION DATE: NORMAL
UNIT ABO/RH: NORMAL
UNIT ABO/RH: NORMAL
UNIT NUMBER: NORMAL
UNIT NUMBER: NORMAL
UNIT STATUS: NORMAL
UNIT STATUS: NORMAL
UNIT TYPE ISBT: 600
UNIT TYPE ISBT: 600
WBC # BLD AUTO: 7.9 10E3/UL (ref 4–11)

## 2024-10-25 PROCEDURE — 88305 TISSUE EXAM BY PATHOLOGIST: CPT | Mod: TC | Performed by: OBSTETRICS & GYNECOLOGY

## 2024-10-25 PROCEDURE — 999N000063 US INTRAOPERATIVE

## 2024-10-25 PROCEDURE — 258N000003 HC RX IP 258 OP 636: Performed by: OBSTETRICS & GYNECOLOGY

## 2024-10-25 PROCEDURE — 370N000017 HC ANESTHESIA TECHNICAL FEE, PER MIN: Performed by: OBSTETRICS & GYNECOLOGY

## 2024-10-25 PROCEDURE — 250N000011 HC RX IP 250 OP 636: Performed by: OBSTETRICS & GYNECOLOGY

## 2024-10-25 PROCEDURE — 88305 TISSUE EXAM BY PATHOLOGIST: CPT | Mod: 26 | Performed by: PATHOLOGY

## 2024-10-25 PROCEDURE — 360N000076 HC SURGERY LEVEL 3, PER MIN: Performed by: OBSTETRICS & GYNECOLOGY

## 2024-10-25 PROCEDURE — 84702 CHORIONIC GONADOTROPIN TEST: CPT | Performed by: OBSTETRICS & GYNECOLOGY

## 2024-10-25 PROCEDURE — 86850 RBC ANTIBODY SCREEN: CPT | Performed by: OBSTETRICS & GYNECOLOGY

## 2024-10-25 PROCEDURE — 36415 COLL VENOUS BLD VENIPUNCTURE: CPT | Performed by: OBSTETRICS & GYNECOLOGY

## 2024-10-25 PROCEDURE — 250N000013 HC RX MED GY IP 250 OP 250 PS 637: Performed by: ANESTHESIOLOGY

## 2024-10-25 PROCEDURE — 250N000011 HC RX IP 250 OP 636: Performed by: ANESTHESIOLOGY

## 2024-10-25 PROCEDURE — 58120 DILATION AND CURETTAGE: CPT | Performed by: NURSE ANESTHETIST, CERTIFIED REGISTERED

## 2024-10-25 PROCEDURE — 258N000003 HC RX IP 258 OP 636: Performed by: ANESTHESIOLOGY

## 2024-10-25 PROCEDURE — 86922 COMPATIBILITY TEST ANTIGLOB: CPT | Performed by: OBSTETRICS & GYNECOLOGY

## 2024-10-25 PROCEDURE — 258N000003 HC RX IP 258 OP 636: Performed by: STUDENT IN AN ORGANIZED HEALTH CARE EDUCATION/TRAINING PROGRAM

## 2024-10-25 PROCEDURE — 85025 COMPLETE CBC W/AUTO DIFF WBC: CPT | Performed by: OBSTETRICS & GYNECOLOGY

## 2024-10-25 PROCEDURE — 250N000009 HC RX 250: Performed by: ANESTHESIOLOGY

## 2024-10-25 PROCEDURE — 86900 BLOOD TYPING SEROLOGIC ABO: CPT | Performed by: OBSTETRICS & GYNECOLOGY

## 2024-10-25 PROCEDURE — 86870 RBC ANTIBODY IDENTIFICATION: CPT | Performed by: OBSTETRICS & GYNECOLOGY

## 2024-10-25 PROCEDURE — 85461 HEMOGLOBIN FETAL: CPT | Performed by: OBSTETRICS & GYNECOLOGY

## 2024-10-25 PROCEDURE — 88262 CHROMOSOME ANALYSIS 15-20: CPT | Performed by: OBSTETRICS & GYNECOLOGY

## 2024-10-25 PROCEDURE — 999N000141 HC STATISTIC PRE-PROCEDURE NURSING ASSESSMENT: Performed by: OBSTETRICS & GYNECOLOGY

## 2024-10-25 PROCEDURE — 88291 CYTO/MOLECULAR REPORT: CPT | Performed by: MEDICAL GENETICS

## 2024-10-25 PROCEDURE — 272N000001 HC OR GENERAL SUPPLY STERILE: Performed by: OBSTETRICS & GYNECOLOGY

## 2024-10-25 PROCEDURE — 58120 DILATION AND CURETTAGE: CPT | Performed by: ANESTHESIOLOGY

## 2024-10-25 PROCEDURE — 250N000011 HC RX IP 250 OP 636: Performed by: STUDENT IN AN ORGANIZED HEALTH CARE EDUCATION/TRAINING PROGRAM

## 2024-10-25 PROCEDURE — 710N000012 HC RECOVERY PHASE 2, PER MINUTE: Performed by: OBSTETRICS & GYNECOLOGY

## 2024-10-25 RX ORDER — HYDROXYCHLOROQUINE SULFATE 200 MG/1
300 TABLET, FILM COATED ORAL DAILY
COMMUNITY

## 2024-10-25 RX ORDER — LIDOCAINE 40 MG/G
CREAM TOPICAL
Status: DISCONTINUED | OUTPATIENT
Start: 2024-10-25 | End: 2024-10-25 | Stop reason: HOSPADM

## 2024-10-25 RX ORDER — ONDANSETRON 2 MG/ML
INJECTION INTRAMUSCULAR; INTRAVENOUS PRN
Status: DISCONTINUED | OUTPATIENT
Start: 2024-10-25 | End: 2024-10-25

## 2024-10-25 RX ORDER — ONDANSETRON 4 MG/1
4 TABLET, ORALLY DISINTEGRATING ORAL EVERY 30 MIN PRN
Status: DISCONTINUED | OUTPATIENT
Start: 2024-10-25 | End: 2024-10-25 | Stop reason: HOSPADM

## 2024-10-25 RX ORDER — NALOXONE HYDROCHLORIDE 0.4 MG/ML
0.1 INJECTION, SOLUTION INTRAMUSCULAR; INTRAVENOUS; SUBCUTANEOUS
Status: DISCONTINUED | OUTPATIENT
Start: 2024-10-25 | End: 2024-10-25 | Stop reason: HOSPADM

## 2024-10-25 RX ORDER — OXYCODONE HYDROCHLORIDE 5 MG/1
10 TABLET ORAL
Status: DISCONTINUED | OUTPATIENT
Start: 2024-10-25 | End: 2024-10-25 | Stop reason: HOSPADM

## 2024-10-25 RX ORDER — PROPOFOL 10 MG/ML
INJECTION, EMULSION INTRAVENOUS PRN
Status: DISCONTINUED | OUTPATIENT
Start: 2024-10-25 | End: 2024-10-25

## 2024-10-25 RX ORDER — OXYCODONE HYDROCHLORIDE 5 MG/1
5 TABLET ORAL
Status: DISCONTINUED | OUTPATIENT
Start: 2024-10-25 | End: 2024-10-25 | Stop reason: HOSPADM

## 2024-10-25 RX ORDER — PROPOFOL 10 MG/ML
INJECTION, EMULSION INTRAVENOUS CONTINUOUS PRN
Status: DISCONTINUED | OUTPATIENT
Start: 2024-10-25 | End: 2024-10-25

## 2024-10-25 RX ORDER — SODIUM CHLORIDE 9 MG/ML
INJECTION, SOLUTION INTRAVENOUS CONTINUOUS PRN
Status: DISCONTINUED | OUTPATIENT
Start: 2024-10-25 | End: 2024-10-25

## 2024-10-25 RX ORDER — FENTANYL CITRATE 50 UG/ML
INJECTION, SOLUTION INTRAMUSCULAR; INTRAVENOUS PRN
Status: DISCONTINUED | OUTPATIENT
Start: 2024-10-25 | End: 2024-10-25

## 2024-10-25 RX ORDER — ONDANSETRON 2 MG/ML
4 INJECTION INTRAMUSCULAR; INTRAVENOUS EVERY 30 MIN PRN
Status: DISCONTINUED | OUTPATIENT
Start: 2024-10-25 | End: 2024-10-25 | Stop reason: HOSPADM

## 2024-10-25 RX ORDER — ACETAMINOPHEN 325 MG/1
975 TABLET ORAL ONCE
Status: COMPLETED | OUTPATIENT
Start: 2024-10-25 | End: 2024-10-25

## 2024-10-25 RX ORDER — ASPIRIN 81 MG/1
162 TABLET ORAL DAILY
COMMUNITY

## 2024-10-25 RX ORDER — DEXAMETHASONE SODIUM PHOSPHATE 10 MG/ML
4 INJECTION, SOLUTION INTRAMUSCULAR; INTRAVENOUS
Status: DISCONTINUED | OUTPATIENT
Start: 2024-10-25 | End: 2024-10-25 | Stop reason: HOSPADM

## 2024-10-25 RX ADMIN — PROPOFOL 50 MG: 10 INJECTION, EMULSION INTRAVENOUS at 17:25

## 2024-10-25 RX ADMIN — DOXYCYCLINE 200 MG: 100 INJECTION, POWDER, LYOPHILIZED, FOR SOLUTION INTRAVENOUS at 16:18

## 2024-10-25 RX ADMIN — FENTANYL CITRATE 25 MCG: 50 INJECTION INTRAMUSCULAR; INTRAVENOUS at 17:25

## 2024-10-25 RX ADMIN — ACETAMINOPHEN 975 MG: 325 TABLET ORAL at 19:12

## 2024-10-25 RX ADMIN — FENTANYL CITRATE 25 MCG: 50 INJECTION INTRAMUSCULAR; INTRAVENOUS at 17:30

## 2024-10-25 RX ADMIN — PROPOFOL 30 MG: 10 INJECTION, EMULSION INTRAVENOUS at 17:27

## 2024-10-25 RX ADMIN — HUMAN RHO(D) IMMUNE GLOBULIN 300 MCG: 1500 SOLUTION INTRAMUSCULAR; INTRAVENOUS at 17:39

## 2024-10-25 RX ADMIN — PROPOFOL 150 MCG/KG/MIN: 10 INJECTION, EMULSION INTRAVENOUS at 17:25

## 2024-10-25 RX ADMIN — MIDAZOLAM 2 MG: 1 INJECTION INTRAMUSCULAR; INTRAVENOUS at 17:19

## 2024-10-25 RX ADMIN — ONDANSETRON 4 MG: 2 INJECTION INTRAMUSCULAR; INTRAVENOUS at 17:48

## 2024-10-25 RX ADMIN — DEXMEDETOMIDINE HYDROCHLORIDE 8 MCG: 100 INJECTION, SOLUTION INTRAVENOUS at 17:27

## 2024-10-25 RX ADMIN — SODIUM CHLORIDE: 9 INJECTION, SOLUTION INTRAVENOUS at 17:18

## 2024-10-25 ASSESSMENT — ACTIVITIES OF DAILY LIVING (ADL)
ADLS_ACUITY_SCORE: 0

## 2024-10-25 NOTE — DISCHARGE INSTRUCTIONS
Discharge Instructions    Pain  You can take ibuprofen up to 800mg every 8 hours OR 600mg every 6 hours for pain.     Activity:  Be sure to walk the length of a football field at least 3 times per day everyday following surgery and at least once the night of surgery. This will help to prevent complications and help you to recover faster.   Your activity level is based on your comfort.   No swimming, bath tubs, or anything in the vagina for 1 week. Showering is okay.     Call [phone] for questions during the day and for emergencies on nights and weekends.  Please call if these things occur:  Fever of 100.4 Fahrenheit degrees or higher.  Significant swelling in one leg.  Difficulty with urination or unable to urinate for a period of more than 6 hours.  Heavy bleeding greater than the amount of a normal period (bleeding for up to a week following the procedure is normal).   Nausea or vomiting that makes you unable to eat for more than a day.     Marisol Alcantara MD

## 2024-10-25 NOTE — ANESTHESIA CARE TRANSFER NOTE
Patient: Ashia Bass    Procedure: Procedure(s):  ULTRASOUND GUIDED DILATION AND CURETTAGE, UTERUS, USING SUCTION       Diagnosis: Miscarriage [O03.9]  Diagnosis Additional Information: No value filed.    Anesthesia Type:   MAC     Note:    Oropharynx: oropharynx clear of all foreign objects  Level of Consciousness: awake  Oxygen Supplementation: room air    Independent Airway: airway patency satisfactory and stable  Dentition: dentition unchanged  Vital Signs Stable: post-procedure vital signs reviewed and stable  Report to RN Given: handoff report given  Patient transferred to: Phase II    Handoff Report: Identifed the Patient, Identified the Reponsible Provider, Reviewed the pertinent medical history, Discussed the surgical course, Reviewed Intra-OP anesthesia mangement and issues during anesthesia, Set expectations for post-procedure period and Allowed opportunity for questions and acknowledgement of understanding      Vitals:  Vitals Value Taken Time   /51 10/25/24 1801   Temp 36.5  C (97.7  F) 10/25/24 1803   Pulse 87 10/25/24 1803   Resp 14    SpO2 100 % 10/25/24 1803   Vitals shown include unfiled device data.    Electronically Signed By: DELICIA Carmichael CRNA  October 25, 2024  6:04 PM

## 2024-10-25 NOTE — H&P
10/25/24   Ashia Bass   1980    History&Physical     HPI: 43 year old  at 10w6d by DOO (27) presents with for suction D&C for miscarriage. US on 10/15/24 showed CRL 0.67cm with absent FHR; previous US on 10/2/24 had positive FHR; thus this is diagnostic of miscarriage. US on 10/25/24 requested by the patient prior to D&C showed absent FHR, thus confirming the previous diagnosis of miscarriage. Patient was counseled on management options and desires surgical management.     OB History:   miscarriage managed expectantly at 6-1/2 weeks 2021      GYN History: denies history of STDs or pelvic infections; denies history of abnormal PAP smears    Medical History: reviewed  Past Medical History:   Diagnosis Date    Acne vulgaris     Alopecia     Bartholin's gland abscess     Endometriosis     Female infertility     Hemifacial spasm of left side of face     Myopia of both eyes with astigmatism     Pituitary adenoma (H)     Relapsing polychondritis     Relapsing polychondritis     Scoliosis     TMJ (temporomandibular joint syndrome)        Surgical History: reviewed  Past Surgical History:   Procedure Laterality Date    COLONOSCOPY      CYST REMOVAL      DAVINCI OOPHORECTOMY Bilateral 8/10/2023    Procedure: BILATERAL OVARIAN CYSTECTOMIES;  Surgeon: Marisol Alcantara MD;  Location: [facility] [place]    LAPAROSCOPY DIAGNOSTIC (GYN) N/A 2023    Procedure: ENDOCERVICAL AND ENDOMETRIAL CULTURES AND BIOPSIES,  BILATERAL FALLOPIAN TUBE RECANALIZATION, BILATERAL SELECTIVE HYSTEROSALPINGOGRAM, HYSTEROSCOPY, DIAGNOSTIC LAPAROSCOPY, ENTEROLYSIS;  Surgeon: Marisol Alcantara MD;  Location: [facility] OR    LASER ABLATION, ENDOMETRIOSIS, USING CO2 LASER, ROBOT ASSISTED, LAPAROSCOPIC, USING DA PRISCA XI N/A 8/10/2023    Procedure: ROBOTIC GUIDED CARBON DIOXIDE LASER EXCISION AND VAPORIZATION OF ENDOMETRIOSIS, EXCISION OF LESIONS FROM SIGMOID COLON AND RECTUM;  Surgeon: Marisol Alcantara  "MD;  Location: [facility] [place]    LYSIS, ADHESIONS, ROBOT-ASSISTED, LAPAROSCOPIC, USING DA PRISCA XI Bilateral 8/10/2023    Procedure: LYSIS OF ADHESIONS, ENTEROLYSIS, LEFT URETEROLYSIS, ADHESION PREVENTION MEASURES INCLUDING LEFT OVARIAN SUSPENSION;  Surgeon: Marisol Alcantara MD;  [place] [facility] [place] History: denies history of bleeding or clotting disorders     Social History:  denies use of tobacco, drugs, or alcohol    Medications:   No current facility-administered medications for this encounter.     Current Outpatient Medications   Medication Sig Dispense Refill    bromocriptine (PARLODEL) 2.5 MG tablet Take 0.5 tablets (1.25 mg total) by mouth daily.      Calcium Citrate-Vitamin D (CALCITRATE/VITAMIN D PO) Take 1,200 mg by mouth daily      Coenzyme Q10-Vitamin E 100-150 MG-UNIT CAPS Take 100 mg by mouth daily      diclofenac (VOLTAREN) 1 % topical gel Apply 2 g topically daily as needed      ibuprofen (ADVIL/MOTRIN) 800 MG tablet Take 1 tablet (800 mg) by mouth every 8 hours as needed for moderate pain 30 tablet 1    Krill Oil (OMEGA-3) 500 MG CAPS Take 500 mg by mouth daily      ondansetron (ZOFRAN ODT) 4 MG ODT tab Take 4 mg by mouth daily as needed      predniSONE (DELTASONE) 20 MG tablet Take 20 mg by mouth daily as needed      Prenatal Vit-Fe Fumarate-FA (PRENATAL MULTIVITAMIN W/IRON) 27-0.8 MG tablet Take 1 tablet by mouth daily      progesterone (PROMETRIUM) 200 MG capsule Take 200 mg by mouth daily      triamcinolone (KENALOG) 0.1 % external cream Apply 1 Application topically      Turmeric 500 MG CAPS Take 500 mg by mouth daily       Allergies: reviewed   No Known Allergies    Vital signs:  Temp: 98.2  F (36.8  C) Temp src: Core BP: 130/57 Pulse: 83   Resp: 18 SpO2: 100 % O2 Device: None (Room air)     Weight: 64.3 kg (141 lb 12.8 oz)  Estimated body mass index is 21.56 kg/m  as calculated from the following:    Height as of 7/21/23: 1.727 m (5' 8\").    Weight as of this " encounter: 64.3 kg (141 lb 12.8 oz).    Physical Exam:  General: No acute distress  Lungs: CTAB  Cardiac: RRR, no M/R/G auscultated  Abdominal: no pain or organomegaly with palpation  Extremeties: no calf pain or edema     Imaging: US as in HPI    Labs: A neg    Assessment/Plan: 43 year old with miscarriage    Plan for suction D&C with ultrasound guidance    We discussed the risks and benefits of surgery including but not limited to the risks of blood loss (the patient consents to blood transfusion if needed), infection, injury to other organs, risks of anesthesia (DVT, pulmonary embolism, pneumonia, death). Also discussed the risk of retained products of conception, although this risk is decreased with US guided D&C. The patient expressed understanding of the above, all of her questions were answered, and she desires to proceed to surgery.        Rh negative; rhogam ordered; assure this is given prior to discharge      Pregnancy loss folder given to patient by pre-op  yes    Miscarriage forms filled out per check list on front of packet  - Consented to pathology  - Desires genetic testing  - Desires personal burial  -informed that a  home (not the patient) will need to collect the fetal remains by calling pathology at [phone]    Discussed plan of care with patient and , and the patient expressed understanding. Opportunity for questions given, and all questions were answered.    Marisol Alcantara MD

## 2024-10-25 NOTE — ANESTHESIA POSTPROCEDURE EVALUATION
Patient: Ashia Bass    Procedure: Procedure(s):  ULTRASOUND GUIDED DILATION AND CURETTAGE, UTERUS, USING SUCTION       Anesthesia Type:  MAC    Note:  Disposition: Outpatient   Postop Pain Control: Uneventful            Sign Out: Well controlled pain   PONV: No   Neuro/Psych: Uneventful            Sign Out: Acceptable/Baseline neuro status   Airway/Respiratory: Uneventful            Sign Out: Acceptable/Baseline resp. status   CV/Hemodynamics: Uneventful            Sign Out: Acceptable CV status; No obvious hypovolemia; No obvious fluid overload   Other NRE:    DID A NON-ROUTINE EVENT OCCUR?            Last vitals:  Vitals Value Taken Time   /55 10/25/24 1830   Temp 36.6  C (97.88  F) 10/25/24 1836   Pulse 73 10/25/24 1836   Resp 16 10/25/24 1815   SpO2 100 % 10/25/24 1836   Vitals shown include unfiled device data.    Electronically Signed By: Joao Mac MD  October 25, 2024  6:37 PM

## 2024-10-25 NOTE — PROVIDER NOTIFICATION
Dr. Alcantara at bedside with patient. Patient expressed writer that V was burnin. Had one hour of doxycycline of left. Half of the dose was administered to patient.    Response: Okay with patient receiving half the dose.

## 2024-10-25 NOTE — OP NOTE
Operative Note   10/25/24     Patient: Ashia Bass 1980     Procedure: Procedure(s):  ULTRASOUND GUIDED DILATION AND CURETTAGE, UTERUS, USING SUCTION     Surgeon(s): Marisol Alcantara MD    Pre-operative Diagnosis:   Miscarriage [O03.9]     Post-operative Diagnosis:   Same     Anestheisa: MAC; paracervical block    Antibiotics: IV doxycycline    EBL: 10cc    UOP: none    IV Fluids: 100ml    Findings:  6 week sized uterus; felt to be empty at conclusion of procedure; confirmed on US thin endometrial stripe <5mm at conclusion of procedure    Description of Procedure:   After informed consent was obtained, the patient was brought to the operating room.  She was transferred to the operating room table and underwent anesthesia.  She was then placed in lithotomy with the yellowfin stirrups with arms out.  The patient was prepped and draped on the perineum including internal vaginal prep. A straight cath not was performed as the bladder was providing a window for US. A speculum was placed into the vagina. 2 cc of 1% lidocaine with epinephrine were placed in the anterior aspect of the cervix and this was then grasped with a long allis.  4 cc of 1% lidocaine with epinephrine were placed in each of the uterosacral nerve complex bundles at 5:00 and 7:00 on the cervix. The remainder of the procedure was done under US guidance. The cervix was dilated, and the 7mm suction currette was used to evacuate the uterus. A sharp curette was used to carefully examine the uterus, and it was felt to be empty; the uterus was confirmed empty with thin EMS on US. All instruments were removed from the uterus. Minimal bleeding was seen from the cervical os. All instruments were removed from the vagina. The patient was cleaned and taken to the recovery room in stable condition. Instrument and sponge counts were correct. She tolerated the procedure well.    Specimens:   ID Type Source Tests Collected by Time Destination   1 :  Products of Conception Products of Conception Products of Conception SURGICAL PATHOLOGY EXAM, CHROMOSOME ANALYSIS, SKIN/PRODUCTS OF CONCEPTION Marisol Alcantara MD 10/25/2024  5:47 PM      Drains: none     Implants: none    Complications: none    Marisol Alcantara MD

## 2024-10-25 NOTE — OR NURSING
Received a call from the blood bank stating that the type and screen would not be ready until 1900 hours. This information was passed on to the OR charge and the doctor doing the procedure.

## 2024-10-25 NOTE — ANESTHESIA PREPROCEDURE EVALUATION
Anesthesia Pre-Procedure Evaluation    Patient: Ashia Bass   MRN: [id number] : 1980        Procedure : Procedure(s):  ULTRASOUND GUIDED DILATION AND CURETTAGE, UTERUS, USING SUCTION          Past Medical History:   Diagnosis Date    Acne vulgaris     Alopecia     Bartholin's gland abscess     Endometriosis     Female infertility     Hemifacial spasm of left side of face     Myopia of both eyes with astigmatism     Pituitary adenoma (H)     Relapsing polychondritis     Relapsing polychondritis     Scoliosis     TMJ (temporomandibular joint syndrome)       Past Surgical History:   Procedure Laterality Date    COLONOSCOPY      CYST REMOVAL      DAVINCI OOPHORECTOMY Bilateral 8/10/2023    Procedure: BILATERAL OVARIAN CYSTECTOMIES;  Surgeon: Marisol Alcantara MD;  [place] [facility] [place]    LAPAROSCOPY DIAGNOSTIC (GYN) N/A 2023    Procedure: ENDOCERVICAL AND ENDOMETRIAL CULTURES AND BIOPSIES,  BILATERAL FALLOPIAN TUBE RECANALIZATION, BILATERAL SELECTIVE HYSTEROSALPINGOGRAM, HYSTEROSCOPY, DIAGNOSTIC LAPAROSCOPY, ENTEROLYSIS;  Surgeon: Marisol Alcantara MD;  Location: [facility] OR    LASER ABLATION, ENDOMETRIOSIS, USING CO2 LASER, ROBOT ASSISTED, LAPAROSCOPIC, USING DA PRISCA XI N/A 8/10/2023    Procedure: ROBOTIC GUIDED CARBON DIOXIDE LASER EXCISION AND VAPORIZATION OF ENDOMETRIOSIS, EXCISION OF LESIONS FROM SIGMOID COLON AND RECTUM;  Surgeon: Marisol Alcantara MD;  [place] [facility] [place]    LYSIS, ADHESIONS, ROBOT-ASSISTED, LAPAROSCOPIC, USING DA PRISCA XI Bilateral 8/10/2023    Procedure: LYSIS OF ADHESIONS, ENTEROLYSIS, LEFT URETEROLYSIS, ADHESION PREVENTION MEASURES INCLUDING LEFT OVARIAN SUSPENSION;  Surgeon: Marisol Alcantara MD;  [place] [facility] [place]      No Known Allergies   Social History     Tobacco Use    Smoking status: Never    Smokeless tobacco: Never   Substance Use Topics    Alcohol use: Never      Wt Readings from Last 1 Encounters:   10/25/24 64.3 kg  "(141 lb 12.8 oz)        Anesthesia Evaluation   Pt has had prior anesthetic.     No history of anesthetic complications       ROS/MED HX  ENT/Pulmonary:  - neg pulmonary ROS     Neurologic:  - neg neurologic ROS     Cardiovascular:  - neg cardiovascular ROS     METS/Exercise Tolerance:     Hematologic:  - neg hematologic  ROS     Musculoskeletal:  - neg musculoskeletal ROS     GI/Hepatic:  - neg GI/hepatic ROS     Renal/Genitourinary:  - neg Renal ROS     Endo: Comment:  Relapsing polychondritis - no recent steroid use - neg endo ROS     Psychiatric/Substance Use:  - neg psychiatric ROS     Infectious Disease:  - neg infectious disease ROS     Malignancy:  - neg malignancy ROS     Other:      (+) Possibly pregnant, , ,         Physical Exam    Airway        Mallampati: II   TM distance: > 3 FB   Neck ROM: full   Mouth opening: > 3 cm    Respiratory Devices and Support         Dental     Comment: Good dentition, no loose or removable teeth        Cardiovascular          Rhythm and rate: regular     Pulmonary   pulmonary exam normal            Other findings:  at 10w6d by DOO (27) presents with for suction D&C for miscarriage.    OUTSIDE LABS:  CBC:   Lab Results   Component Value Date    WBC 7.9 10/25/2024    HGB 13.4 10/25/2024    HCT 39.4 10/25/2024     10/25/2024     BMP: No results found for: \"NA\", \"POTASSIUM\", \"CHLORIDE\", \"CO2\", \"BUN\", \"CR\", \"GLC\"  COAGS: No results found for: \"PTT\", \"INR\", \"FIBR\"  POC:   Lab Results   Component Value Date    HCG Negative 2023     HEPATIC: No results found for: \"ALBUMIN\", \"PROTTOTAL\", \"ALT\", \"AST\", \"GGT\", \"ALKPHOS\", \"BILITOTAL\", \"BILIDIRECT\", \"RON\"  OTHER: No results found for: \"PH\", \"LACT\", \"A1C\", \"LILIA\", \"PHOS\", \"MAG\", \"LIPASE\", \"AMYLASE\", \"TSH\", \"T4\", \"T3\", \"CRP\", \"SED\"    Anesthesia Plan    ASA Status:  2    NPO Status:  NPO Appropriate    Anesthesia Type: MAC.              Consents    Anesthesia Plan(s) and associated risks, benefits, and realistic " alternatives discussed. Questions answered and patient/representative(s) expressed understanding.     - Discussed:     - Discussed with:  Patient, Spouse      - Extended Intubation/Ventilatory Support Discussed: No.      - Patient is DNR/DNI Status: No     Use of blood products discussed: No .     Postoperative Care    Pain management: IV analgesics.   PONV prophylaxis: Ondansetron (or other 5HT-3), Dexamethasone or Solumedrol     Comments:    Other Comments: Acetaminophen/Ketorolac  Dexamethasone/ondansetron PONV ppx           Yahaira Mendes MD    I have reviewed the pertinent notes and labs in the chart from the past 30 days and (re)examined the patient.  Any updates or changes from those notes are reflected in this note.             # Drug Induced Platelet Defect: home medication list includes an antiplatelet medication

## 2024-10-29 LAB
PATH REPORT.COMMENTS IMP SPEC: NORMAL
PATH REPORT.COMMENTS IMP SPEC: NORMAL
PATH REPORT.FINAL DX SPEC: NORMAL
PATH REPORT.GROSS SPEC: NORMAL
PATH REPORT.MICROSCOPIC SPEC OTHER STN: NORMAL
PATH REPORT.RELEVANT HX SPEC: NORMAL
PHOTO IMAGE: NORMAL

## 2024-11-11 ENCOUNTER — TRANSFERRED RECORDS (OUTPATIENT)
Dept: HEALTH INFORMATION MANAGEMENT | Facility: CLINIC | Age: 44
End: 2024-11-11

## 2024-11-29 LAB
INTERPRETATION: NORMAL
ISCN: NORMAL
METHODS: NORMAL

## 2024-12-11 ENCOUNTER — MEDICAL CORRESPONDENCE (OUTPATIENT)
Dept: HEALTH INFORMATION MANAGEMENT | Facility: CLINIC | Age: 44
End: 2024-12-11
Payer: COMMERCIAL

## 2024-12-20 ENCOUNTER — TRANSFERRED RECORDS (OUTPATIENT)
Dept: HEALTH INFORMATION MANAGEMENT | Facility: CLINIC | Age: 44
End: 2024-12-20
Payer: COMMERCIAL

## 2024-12-23 ENCOUNTER — TRANSCRIBE ORDERS (OUTPATIENT)
Dept: MATERNAL FETAL MEDICINE | Facility: CLINIC | Age: 44
End: 2024-12-23
Payer: COMMERCIAL

## 2024-12-23 DIAGNOSIS — Z31.69 ENCOUNTER FOR PRECONCEPTION CONSULTATION: Primary | ICD-10-CM

## 2025-02-08 ENCOUNTER — HEALTH MAINTENANCE LETTER (OUTPATIENT)
Age: 45
End: 2025-02-08

## 2025-06-22 ENCOUNTER — HEALTH MAINTENANCE LETTER (OUTPATIENT)
Age: 45
End: 2025-06-22

## (undated) DEVICE — DRAPE IOBAN INCISE 23X17" 6650EZ

## (undated) DEVICE — CATH FOLEY 16FR 5ML LUBRICATH LATEX 0165L16

## (undated) DEVICE — SYR 50ML SLIP TIP W/O NDL 309654

## (undated) DEVICE — DAVINCI XI SUCTION IRRIGATOR ENDOWRIST 480299

## (undated) DEVICE — DAVINCI XI DRAPE COLUMN 470341

## (undated) DEVICE — SUCTION CANNULA UTERINE 07MM CVD 022107-10

## (undated) DEVICE — SUTURE MONOCRYL+ 2-0 27IN SH UND MCP417H

## (undated) DEVICE — DRSG TELFA 3X8" 1238

## (undated) DEVICE — MAT FLOOR SURGICAL 40X38 0702140238

## (undated) DEVICE — DRAPE POUCH INSTRUMENT 3 POCKET 1018L

## (undated) DEVICE — SU VICRYL+ 0 27 UR6 VLT VCP603H

## (undated) DEVICE — CUSTOM PACK DA VINCI GYN SMA5BDVHEA

## (undated) DEVICE — GLOVE UNDER INDICATOR PI SZ 7.0 LF 41670

## (undated) DEVICE — GOWN IMPERVIOUS ZONED LG

## (undated) DEVICE — JELLY LUBRICATING SURGILUBE 2OZ TUBE

## (undated) DEVICE — DAVINCI HOT SHEARS TIP COVER  400180

## (undated) DEVICE — SOL WATER IRRIG 1000ML BOTTLE 2F7114

## (undated) DEVICE — Device

## (undated) DEVICE — GLOVE BIOGEL PI ULTRATOUCH G SZ 7.0 42170

## (undated) DEVICE — GLOVE PROTEXIS MICRO 5.5  2D73PM55

## (undated) DEVICE — BRIEF STRETCH XL MPS40

## (undated) DEVICE — CUSTOM PACK PERI GYN SMA5BPGHEA

## (undated) DEVICE — DRAPE U SPLIT 74X120" 29440

## (undated) DEVICE — GOWN LG DISP 9515

## (undated) DEVICE — GOWN IMPERVIOUS BREATHABLE SMART LG 89015

## (undated) DEVICE — SOLUTION IRRIG 2B7127 .9NS 3000ML BAG

## (undated) DEVICE — SYSTEM LAPAROVUE VISIBILITY LAPVUE10

## (undated) DEVICE — ENDO OBTURATOR ACCESS PORT BLADELESS 8X100MM IAS8-100LP

## (undated) DEVICE — ADAPTER DRAPE ALLY AU-AD

## (undated) DEVICE — PREP CHLORAPREP 26ML TINTED HI-LITE ORANGE 930815

## (undated) DEVICE — SUTURE VICRYL+ 4-0 UNDYED PS-2 VCP496H

## (undated) DEVICE — DRSG TEGADERM 2 3/8X2 3/4" 1624W

## (undated) DEVICE — PROTECTOR ARM STANDARD ONE STEP

## (undated) DEVICE — TUBING FILTER TRI-LUMEN AIRSEAL ASC-EVAC1

## (undated) DEVICE — DECANTER VIAL 2006S

## (undated) DEVICE — DRSG GAUZE 2X2" 8042

## (undated) DEVICE — TUBING LAP SUCT/IRRIG STRYKER 250070500

## (undated) DEVICE — PREP DYNA-HEX 4% CHG SCRUB 4OZ BOTTLE MDS098710

## (undated) DEVICE — LUBRICANT INST ELECTROLUBE EL101

## (undated) DEVICE — BARRIER SEPRAFILM 5X6" SINGLE SHEET 4301-02

## (undated) DEVICE — UTERINE MANIPULATOR RUMI 6.7MMX8CM UMB678

## (undated) DEVICE — DAVINCI XI SEAL UNIVERSAL 5-8MM 470361

## (undated) DEVICE — TRENGUARD 450 PROCEDURAL PACK 111404

## (undated) DEVICE — PAD POS XL 1X20X40IN PINK PIGAZZI

## (undated) DEVICE — DAVINCI XI OBTURATOR BLADELESS 8MM 470359

## (undated) DEVICE — DRSG TELFA 3X4" 1050

## (undated) DEVICE — CURETTE SUCTION PIPELLE ENDOMETRIAL 3.1MMX23.5CM  8200

## (undated) DEVICE — SYR 50ML CATH TIP W/O NDL 309620

## (undated) DEVICE — TUBING VACUUM COLLECTION 6FT 23116

## (undated) DEVICE — DECANTER BAG 2002S

## (undated) DEVICE — BLADE KNIFE SURG 15 371115

## (undated) DEVICE — DAVINCI XI DRAPE ARM 470015

## (undated) DEVICE — SU WND CLOSURE VLOC 180 ABS 4-0 6" CV-23 VLOCL0803

## (undated) DEVICE — SU PDS II 5-0 RB-1 DA 30" Z320H

## (undated) RX ORDER — DEXAMETHASONE SODIUM PHOSPHATE 10 MG/ML
INJECTION, SOLUTION INTRAMUSCULAR; INTRAVENOUS
Status: DISPENSED
Start: 2023-08-10

## (undated) RX ORDER — PROPOFOL 10 MG/ML
INJECTION, EMULSION INTRAVENOUS
Status: DISPENSED
Start: 2023-08-10

## (undated) RX ORDER — ONDANSETRON 2 MG/ML
INJECTION INTRAMUSCULAR; INTRAVENOUS
Status: DISPENSED
Start: 2023-08-10

## (undated) RX ORDER — BUPIVACAINE HYDROCHLORIDE AND EPINEPHRINE 2.5; 5 MG/ML; UG/ML
INJECTION, SOLUTION EPIDURAL; INFILTRATION; INTRACAUDAL; PERINEURAL
Status: DISPENSED
Start: 2024-10-25

## (undated) RX ORDER — VASOPRESSIN 20 U/ML
INJECTION PARENTERAL
Status: DISPENSED
Start: 2023-08-10

## (undated) RX ORDER — FENTANYL CITRATE 50 UG/ML
INJECTION, SOLUTION INTRAMUSCULAR; INTRAVENOUS
Status: DISPENSED
Start: 2023-08-10

## (undated) RX ORDER — FENTANYL CITRATE 50 UG/ML
INJECTION, SOLUTION INTRAMUSCULAR; INTRAVENOUS
Status: DISPENSED
Start: 2024-10-25

## (undated) RX ORDER — BUPIVACAINE HYDROCHLORIDE 2.5 MG/ML
INJECTION, SOLUTION EPIDURAL; INFILTRATION; INTRACAUDAL
Status: DISPENSED
Start: 2023-08-10

## (undated) RX ORDER — FENTANYL CITRATE-0.9 % NACL/PF 10 MCG/ML
PLASTIC BAG, INJECTION (ML) INTRAVENOUS
Status: DISPENSED
Start: 2023-08-10